# Patient Record
Sex: FEMALE | Race: WHITE | Employment: OTHER | URBAN - METROPOLITAN AREA
[De-identification: names, ages, dates, MRNs, and addresses within clinical notes are randomized per-mention and may not be internally consistent; named-entity substitution may affect disease eponyms.]

---

## 2021-03-02 LAB
ALBUMIN SERPL-MCNC: NORMAL G/DL
ALP BLD-CCNC: NORMAL U/L
ALT SERPL-CCNC: NORMAL U/L
ANION GAP SERPL CALCULATED.3IONS-SCNC: NORMAL MMOL/L
AST SERPL-CCNC: NORMAL U/L
BILIRUB SERPL-MCNC: NORMAL MG/DL
BUN BLDV-MCNC: NORMAL MG/DL
CALCIUM SERPL-MCNC: NORMAL MG/DL
CHLORIDE BLD-SCNC: NORMAL MMOL/L
CHOLESTEROL, TOTAL: NORMAL
CHOLESTEROL/HDL RATIO: NORMAL
CO2: NORMAL
CREAT SERPL-MCNC: NORMAL MG/DL
GFR CALCULATED: NORMAL
GLUCOSE BLD-MCNC: NORMAL MG/DL
HDLC SERPL-MCNC: NORMAL MG/DL
LDL CHOLESTEROL CALCULATED: NORMAL
NONHDLC SERPL-MCNC: NORMAL MG/DL
POTASSIUM SERPL-SCNC: NORMAL MMOL/L
SODIUM BLD-SCNC: NORMAL MMOL/L
TOTAL PROTEIN: NORMAL
TRIGL SERPL-MCNC: NORMAL MG/DL
VITAMIN D 25-HYDROXY: NORMAL
VITAMIN D2, 25 HYDROXY: NORMAL
VITAMIN D3,25 HYDROXY: NORMAL
VLDLC SERPL CALC-MCNC: NORMAL MG/DL

## 2022-03-21 LAB
ALBUMIN SERPL-MCNC: NORMAL G/DL
ALP BLD-CCNC: NORMAL U/L
ALT SERPL-CCNC: NORMAL U/L
ANION GAP SERPL CALCULATED.3IONS-SCNC: NORMAL MMOL/L
AST SERPL-CCNC: NORMAL U/L
BASOPHILS ABSOLUTE: NORMAL
BASOPHILS RELATIVE PERCENT: NORMAL
BILIRUB SERPL-MCNC: NORMAL MG/DL
BUN BLDV-MCNC: NORMAL MG/DL
CALCIUM SERPL-MCNC: NORMAL MG/DL
CHLORIDE BLD-SCNC: NORMAL MMOL/L
CHOLESTEROL, TOTAL: NORMAL
CHOLESTEROL/HDL RATIO: NORMAL
CO2: NORMAL
CREAT SERPL-MCNC: NORMAL MG/DL
EOSINOPHILS ABSOLUTE: NORMAL
EOSINOPHILS RELATIVE PERCENT: NORMAL
GFR CALCULATED: NORMAL
GLUCOSE BLD-MCNC: NORMAL MG/DL
HCT VFR BLD CALC: NORMAL %
HDLC SERPL-MCNC: NORMAL MG/DL
HEMOGLOBIN: NORMAL
LDL CHOLESTEROL CALCULATED: NORMAL
LYMPHOCYTES ABSOLUTE: NORMAL
LYMPHOCYTES RELATIVE PERCENT: NORMAL
MCH RBC QN AUTO: NORMAL PG
MCHC RBC AUTO-ENTMCNC: NORMAL G/DL
MCV RBC AUTO: NORMAL FL
MONOCYTES ABSOLUTE: NORMAL
MONOCYTES RELATIVE PERCENT: NORMAL
NEUTROPHILS ABSOLUTE: NORMAL
NEUTROPHILS RELATIVE PERCENT: NORMAL
NONHDLC SERPL-MCNC: NORMAL MG/DL
PLATELET # BLD: NORMAL 10*3/UL
PMV BLD AUTO: NORMAL FL
POTASSIUM SERPL-SCNC: NORMAL MMOL/L
RBC # BLD: NORMAL 10*6/UL
SODIUM BLD-SCNC: NORMAL MMOL/L
TOTAL PROTEIN: NORMAL
TRIGL SERPL-MCNC: NORMAL MG/DL
TSH SERPL DL<=0.05 MIU/L-ACNC: NORMAL M[IU]/L
VITAMIN D 25-HYDROXY: NORMAL
VITAMIN D2, 25 HYDROXY: NORMAL
VITAMIN D3,25 HYDROXY: NORMAL
VLDLC SERPL CALC-MCNC: NORMAL MG/DL
WBC # BLD: NORMAL 10*3/UL

## 2022-06-20 ENCOUNTER — OFFICE VISIT (OUTPATIENT)
Dept: PRIMARY CARE CLINIC | Age: 72
End: 2022-06-20
Payer: MEDICARE

## 2022-06-20 VITALS
HEIGHT: 61 IN | SYSTOLIC BLOOD PRESSURE: 149 MMHG | OXYGEN SATURATION: 93 % | BODY MASS INDEX: 19.07 KG/M2 | WEIGHT: 101 LBS | TEMPERATURE: 97.9 F | DIASTOLIC BLOOD PRESSURE: 86 MMHG | HEART RATE: 102 BPM

## 2022-06-20 DIAGNOSIS — H25.9 SENILE CATARACT OF RIGHT EYE, UNSPECIFIED AGE-RELATED CATARACT TYPE: Primary | ICD-10-CM

## 2022-06-20 PROCEDURE — 99213 OFFICE O/P EST LOW 20 MIN: CPT | Performed by: FAMILY MEDICINE

## 2022-06-20 PROCEDURE — 1123F ACP DISCUSS/DSCN MKR DOCD: CPT | Performed by: FAMILY MEDICINE

## 2022-06-20 RX ORDER — TIOTROPIUM BROMIDE 18 UG/1
CAPSULE ORAL; RESPIRATORY (INHALATION)
COMMUNITY
Start: 2022-05-20 | End: 2022-06-20 | Stop reason: SDUPTHER

## 2022-06-20 RX ORDER — ATORVASTATIN CALCIUM 10 MG/1
TABLET, FILM COATED ORAL
Qty: 30 TABLET | Refills: 3 | Status: SHIPPED
Start: 2022-06-20 | End: 2022-09-20 | Stop reason: SDUPTHER

## 2022-06-20 RX ORDER — BUDESONIDE AND FORMOTEROL FUMARATE DIHYDRATE 160; 4.5 UG/1; UG/1
AEROSOL RESPIRATORY (INHALATION)
COMMUNITY
Start: 2022-05-28 | End: 2022-06-20 | Stop reason: SDUPTHER

## 2022-06-20 RX ORDER — BUDESONIDE AND FORMOTEROL FUMARATE DIHYDRATE 160; 4.5 UG/1; UG/1
AEROSOL RESPIRATORY (INHALATION)
Qty: 10.2 G | Refills: 2 | Status: SHIPPED
Start: 2022-06-20 | End: 2022-09-19

## 2022-06-20 RX ORDER — MONTELUKAST SODIUM 10 MG/1
TABLET ORAL
Qty: 30 TABLET | Refills: 2 | Status: SHIPPED
Start: 2022-06-20 | End: 2022-09-20 | Stop reason: SDUPTHER

## 2022-06-20 RX ORDER — MONTELUKAST SODIUM 10 MG/1
TABLET ORAL
COMMUNITY
Start: 2022-05-20 | End: 2022-06-20 | Stop reason: SDUPTHER

## 2022-06-20 RX ORDER — ERGOCALCIFEROL 1.25 MG/1
50000 CAPSULE ORAL WEEKLY
Qty: 12 CAPSULE | Refills: 1 | Status: SHIPPED
Start: 2022-06-20 | End: 2022-09-20 | Stop reason: SDUPTHER

## 2022-06-20 RX ORDER — TIOTROPIUM BROMIDE 18 UG/1
CAPSULE ORAL; RESPIRATORY (INHALATION)
Qty: 30 CAPSULE | Refills: 2 | Status: SHIPPED
Start: 2022-06-20 | End: 2022-09-19

## 2022-06-20 SDOH — ECONOMIC STABILITY: TRANSPORTATION INSECURITY
IN THE PAST 12 MONTHS, HAS LACK OF TRANSPORTATION KEPT YOU FROM MEETINGS, WORK, OR FROM GETTING THINGS NEEDED FOR DAILY LIVING?: NO

## 2022-06-20 SDOH — ECONOMIC STABILITY: FOOD INSECURITY: WITHIN THE PAST 12 MONTHS, THE FOOD YOU BOUGHT JUST DIDN'T LAST AND YOU DIDN'T HAVE MONEY TO GET MORE.: NEVER TRUE

## 2022-06-20 SDOH — ECONOMIC STABILITY: INCOME INSECURITY: IN THE LAST 12 MONTHS, WAS THERE A TIME WHEN YOU WERE NOT ABLE TO PAY THE MORTGAGE OR RENT ON TIME?: NO

## 2022-06-20 SDOH — ECONOMIC STABILITY: FOOD INSECURITY: WITHIN THE PAST 12 MONTHS, YOU WORRIED THAT YOUR FOOD WOULD RUN OUT BEFORE YOU GOT MONEY TO BUY MORE.: NEVER TRUE

## 2022-06-20 SDOH — ECONOMIC STABILITY: HOUSING INSECURITY
IN THE LAST 12 MONTHS, WAS THERE A TIME WHEN YOU DID NOT HAVE A STEADY PLACE TO SLEEP OR SLEPT IN A SHELTER (INCLUDING NOW)?: NO

## 2022-06-20 SDOH — ECONOMIC STABILITY: TRANSPORTATION INSECURITY
IN THE PAST 12 MONTHS, HAS THE LACK OF TRANSPORTATION KEPT YOU FROM MEDICAL APPOINTMENTS OR FROM GETTING MEDICATIONS?: NO

## 2022-06-20 ASSESSMENT — PATIENT HEALTH QUESTIONNAIRE - PHQ9
SUM OF ALL RESPONSES TO PHQ QUESTIONS 1-9: 2
1. LITTLE INTEREST OR PLEASURE IN DOING THINGS: 1
SUM OF ALL RESPONSES TO PHQ QUESTIONS 1-9: 2
2. FEELING DOWN, DEPRESSED OR HOPELESS: 1
SUM OF ALL RESPONSES TO PHQ9 QUESTIONS 1 & 2: 2

## 2022-06-20 ASSESSMENT — SOCIAL DETERMINANTS OF HEALTH (SDOH): HOW HARD IS IT FOR YOU TO PAY FOR THE VERY BASICS LIKE FOOD, HOUSING, MEDICAL CARE, AND HEATING?: NOT VERY HARD

## 2022-06-20 NOTE — PROGRESS NOTES
Anitra Neumann (:  1950) is a 67 y.o. female,Established patient, here for evaluation of the following chief complaint(s):  Check-Up (had cataract surgery)  on left eye        Needs         ASSESSMENT/PLAN:  1. Senile cataract of right eye, unspecified age-related cataract type      Return in about 3 months (around 2022). Subjective   SUBJECTIVE/OBJECTIVE:  HPI      BP (!) 149/86   Pulse (!) 102   Temp 97.9 °F (36.6 °C)   Ht 5' 1\" (1.549 m)   Wt 101 lb (45.8 kg)   SpO2 93%   BMI 19.08 kg/m²     Review of Systems   Eyes: Positive for visual disturbance. Objective   Physical Exam  Constitutional:       General: She is not in acute distress. Appearance: Normal appearance. She is normal weight. Eyes:      Extraocular Movements: Extraocular movements intact. Pupils: Pupils are equal, round, and reactive to light. Cardiovascular:      Rate and Rhythm: Normal rate and regular rhythm. Heart sounds: Normal heart sounds. No murmur heard. Pulmonary:      Effort: Pulmonary effort is normal.      Breath sounds: Normal breath sounds. Neurological:      Mental Status: She is alert. Psychiatric:         Mood and Affect: Mood normal.                      An electronic signature was used to authenticate this note.     --Hoang Noble MD
Implemented All Universal Safety Interventions:  [place] to call system. Call bell, personal items and telephone within reach. Instruct patient to call for assistance. Room bathroom lighting operational. Non-slip footwear when patient is off stretcher. Physically safe environment: no spills, clutter or unnecessary equipment. Stretcher in lowest position, wheels locked, appropriate side rails in place.

## 2022-09-19 RX ORDER — BUDESONIDE AND FORMOTEROL FUMARATE DIHYDRATE 160; 4.5 UG/1; UG/1
AEROSOL RESPIRATORY (INHALATION)
Qty: 10.2 G | Refills: 2 | Status: SHIPPED
Start: 2022-09-19 | End: 2022-09-20 | Stop reason: SDUPTHER

## 2022-09-19 RX ORDER — TIOTROPIUM BROMIDE 18 UG/1
CAPSULE ORAL; RESPIRATORY (INHALATION)
Qty: 30 CAPSULE | Refills: 2 | Status: SHIPPED
Start: 2022-09-19 | End: 2022-09-20 | Stop reason: SDUPTHER

## 2022-09-20 ENCOUNTER — OFFICE VISIT (OUTPATIENT)
Dept: PRIMARY CARE CLINIC | Age: 72
End: 2022-09-20
Payer: MEDICARE

## 2022-09-20 VITALS
WEIGHT: 102 LBS | TEMPERATURE: 97.7 F | DIASTOLIC BLOOD PRESSURE: 80 MMHG | SYSTOLIC BLOOD PRESSURE: 157 MMHG | HEART RATE: 94 BPM | BODY MASS INDEX: 19.26 KG/M2 | HEIGHT: 61 IN | OXYGEN SATURATION: 98 %

## 2022-09-20 DIAGNOSIS — E55.9 VITAMIN D DEFICIENCY: ICD-10-CM

## 2022-09-20 DIAGNOSIS — H61.23 BILATERAL IMPACTED CERUMEN: ICD-10-CM

## 2022-09-20 DIAGNOSIS — E78.5 HYPERLIPIDEMIA, UNSPECIFIED HYPERLIPIDEMIA TYPE: ICD-10-CM

## 2022-09-20 DIAGNOSIS — J43.9 PULMONARY EMPHYSEMA, UNSPECIFIED EMPHYSEMA TYPE (HCC): Primary | ICD-10-CM

## 2022-09-20 PROCEDURE — 99214 OFFICE O/P EST MOD 30 MIN: CPT | Performed by: FAMILY MEDICINE

## 2022-09-20 PROCEDURE — 1123F ACP DISCUSS/DSCN MKR DOCD: CPT | Performed by: FAMILY MEDICINE

## 2022-09-20 RX ORDER — ERGOCALCIFEROL 1.25 MG/1
50000 CAPSULE ORAL WEEKLY
Qty: 12 CAPSULE | Refills: 1 | Status: SHIPPED | OUTPATIENT
Start: 2022-09-20

## 2022-09-20 RX ORDER — BUDESONIDE AND FORMOTEROL FUMARATE DIHYDRATE 160; 4.5 UG/1; UG/1
AEROSOL RESPIRATORY (INHALATION)
Qty: 10.2 G | Refills: 2 | Status: SHIPPED | OUTPATIENT
Start: 2022-09-20

## 2022-09-20 RX ORDER — ATORVASTATIN CALCIUM 10 MG/1
TABLET, FILM COATED ORAL
Qty: 30 TABLET | Refills: 3 | Status: SHIPPED | OUTPATIENT
Start: 2022-09-20

## 2022-09-20 RX ORDER — TIOTROPIUM BROMIDE 18 UG/1
18 CAPSULE ORAL; RESPIRATORY (INHALATION) DAILY
Qty: 30 CAPSULE | Refills: 2 | Status: SHIPPED | OUTPATIENT
Start: 2022-09-20

## 2022-09-20 RX ORDER — MONTELUKAST SODIUM 10 MG/1
TABLET ORAL
Qty: 30 TABLET | Refills: 2 | Status: SHIPPED | OUTPATIENT
Start: 2022-09-20

## 2022-09-20 ASSESSMENT — ENCOUNTER SYMPTOMS: SHORTNESS OF BREATH: 1

## 2022-09-20 NOTE — PROGRESS NOTES
Jose Ivey (:  1950) is a 67 y.o. female,Established patient, here for evaluation of the following chief complaint(s):  Sinusitis (Ears feel plugged and sinuses are bothering her)         ASSESSMENT/PLAN:  1. Pulmonary emphysema, unspecified emphysema type ([facility] [address]  -     SYMBICORT 160-4.5 MCG/ACT AERO; Inhale 2 puffs by mouth twice a day, Disp-10.2 g, R-2, DAWNormal  -     montelukast (SINGULAIR) 10 MG tablet; take 1 tablet by mouth once daily, Disp-30 tablet, R-2Normal  -     SPIRIVA HANDIHALER 18 MCG inhalation capsule; Inhale 1 capsule into the lungs daily, Disp-30 capsule, R-2, DAWNormal  2. Vitamin D deficiency  -     vitamin D (ERGOCALCIFEROL) 1.25 MG (81399 UT) CAPS capsule; Take 1 capsule by mouth once a week, Disp-12 capsule, R-1Normal  3. Hyperlipidemia, unspecified hyperlipidemia type  -     atorvastatin (LIPITOR) 10 MG tablet; take 1 tablet by mouth once daily, Disp-30 tablet, R-3Normal  4. Bilateral impacted cerumen  -     carbamide peroxide (DEBROX) 6.5 % otic solution; Place 5 drops in ear(s) 2 times daily, Disp-15 mL, R-1Normal      No follow-ups on file. Subjective   SUBJECTIVE/OBJECTIVE:  Sinusitis  Associated symptoms include shortness of breath.    COPD:   Primary symptoms:  [] chest tightness  [] hoarse voice  [x] Cough  [] shortness of breath [] difficulty breathing  [] sputum production  [] frequent throat clearing [] Wheezing   [] Hemoptysis    Chronicity: [] New  [] Recurrent  [x] Chronic    Onset  [] Today   [] Yesterday   [] in the past 7 days    [] 1 to 4 weeks ago  [] more than 1 month ago [x] more than 1 year ago    Frequency  [] Constantly   [] 2 to 4 times per day  [x] Daily   [] every several days [] Intermittently   [] Rarely     Progression since onset  [] Unchanged  [] Resolved   [] gradually improving   [] rapidly improving  [x] gradually worsening [] rapidly worsening  [] waxing and waning    Cough characteristics  [x] non-productive  [] Productive  [] Hoarse  [] Barking   [] Productive of sputum [] Nocturnal  [] Croupy  []Paroxysmal  [] Productive of purulent sputum   [] Dry    [] Supine   [] Productive of brown sputum   [] Hacking  [] Harsh    [] Productive of blood-tinged sputum  [] vomit inducing  [] Productive of bloody sputum    Associated symptoms  [] Appetite change  [] Heartburn  [] Rhinorrhea  [] chest pain  [] Malaise/fatigue  [] Sneezing  [] Dyspnea on exertion  [] Myalgias   [] Sore throat  [] Ear congestion   [] nasal congestion  [] Sweats  [] ear pain  [] Orthopnea  [] trouble swallowing  [] Fever  [] PND  [] weight loss  [] Headaches   [] post-nasal drip    Aggravated by  [] Nothing  [] emotional stress [] minimal activity [] animal exposure  [] Exercise  [] occupational exposure   [] any activity  [] exposure to fumes [] Pollen  [] Change in weather   [] Exposure to smoke   [] Strenuous activity [] Climbing stairs  [] Lying down  [] URI   [] eating    Alleviated by  [] Nothing  [] Diuretics  [] OTC cough suppressant [] anxiolytics  [] Ipratropium  [] OTC inhaler [] Beta-agonist    [] Leukotriene antagonist   [] Prescription cough suppressant  [] Change in position    [] Nitroglycerine  [] rest  [] Cold air  [] Oral steroids [] Steroid inhaler      Improvement on treatment  [] No  [] Minimal  [] Moderate [] Significant  [] Complete     Not alleviated by  [] Anxiolytic  [] Ipratropium  [] OTC inhaler [] Beta-agonist   [] Leukotriene antagonist   [] Prescription cough suppressant  [] Change in position    [] Rest  [] Cold air  [] Oral steroids [] Steroid inhaler [] Diuretics  [] Nitroglycerin   [] OTC cough suppressant    Lung disease risks  [] No known risk factors  [] Occupational exposure  [] Travel   [] Animal exposure   [] Smoking/tobacco exposure    PMH includes  [] Asthma  [] Bronchitis  [] Emphysema [] Bronchiectasis  [] COPD  [] Pneumonia      BP (!) 157/80   Pulse 94   Temp 97.7 °F (36.5 °C) (Temporal)   Ht 5' 1\" (1.549 m)   Wt 102 lb (46.3 kg)   LMP  (LMP Unknown)   SpO2 98%   BMI 19.27 kg/m²     Review of Systems   Respiratory:  Positive for shortness of breath. No results found for: NA, K, CL, CO2, BUN, CREATININE, GLUCOSE, CALCIUM, PROT, LABALBU, BILITOT, ALKPHOS, AST, ALT, LABGLOM, GFRAA, AGRATIO, GLOB  No results found for: CHOL, TRIG, HDL, LDLCHOLESTEROL, LDLCALC, LABVLDL, VLDL, CHOLHDLRATIO  No results found for: WBC, HGB, HCT, MCV, PLT  No results found for: TSHFT4, TSH, TSHREFLEX, EXL9DPB  No results found for: VITD25      Objective   Physical Exam  Constitutional:       Appearance: Normal appearance. Cardiovascular:      Rate and Rhythm: Normal rate and regular rhythm. Heart sounds: Normal heart sounds. No murmur heard. Pulmonary:      Comments: Decreased  breath sounds   Abdominal:      Tenderness: There is no abdominal tenderness. Neurological:      Mental Status: She is alert. Psychiatric:         Mood and Affect: Mood normal.                    An electronic signature was used to authenticate this note.     --Herve Llanso MD

## 2022-11-23 ENCOUNTER — TELEPHONE (OUTPATIENT)
Dept: PRIMARY CARE CLINIC | Age: 72
End: 2022-11-23

## 2022-12-16 DIAGNOSIS — E78.5 HYPERLIPIDEMIA, UNSPECIFIED HYPERLIPIDEMIA TYPE: ICD-10-CM

## 2022-12-16 DIAGNOSIS — J43.9 PULMONARY EMPHYSEMA, UNSPECIFIED EMPHYSEMA TYPE (HCC): ICD-10-CM

## 2022-12-16 RX ORDER — ATORVASTATIN CALCIUM 10 MG/1
TABLET, FILM COATED ORAL
Qty: 30 TABLET | Refills: 3 | Status: SHIPPED | OUTPATIENT
Start: 2022-12-16

## 2022-12-16 RX ORDER — MONTELUKAST SODIUM 10 MG/1
TABLET ORAL
Qty: 30 TABLET | Refills: 2 | Status: SHIPPED | OUTPATIENT
Start: 2022-12-16

## 2022-12-16 RX ORDER — TIOTROPIUM BROMIDE 18 UG/1
18 CAPSULE ORAL; RESPIRATORY (INHALATION) DAILY
Qty: 30 CAPSULE | Refills: 2 | Status: SHIPPED | OUTPATIENT
Start: 2022-12-16

## 2022-12-16 RX ORDER — BUDESONIDE AND FORMOTEROL FUMARATE DIHYDRATE 160; 4.5 UG/1; UG/1
AEROSOL RESPIRATORY (INHALATION)
Qty: 10.2 G | Refills: 2 | Status: SHIPPED | OUTPATIENT
Start: 2022-12-16

## 2023-01-23 ENCOUNTER — COMMUNITY OUTREACH (OUTPATIENT)
Dept: PRIMARY CARE CLINIC | Age: 73
End: 2023-01-23

## 2023-01-23 NOTE — PROGRESS NOTES
Patient's HM shows they are overdue for colonoscopy, mammogram.   [employer] and  files searched  without success.

## 2023-03-09 DIAGNOSIS — E55.9 VITAMIN D DEFICIENCY: ICD-10-CM

## 2023-03-09 DIAGNOSIS — J43.9 PULMONARY EMPHYSEMA, UNSPECIFIED EMPHYSEMA TYPE (HCC): ICD-10-CM

## 2023-03-09 DIAGNOSIS — E78.5 HYPERLIPIDEMIA, UNSPECIFIED HYPERLIPIDEMIA TYPE: ICD-10-CM

## 2023-03-09 RX ORDER — ERGOCALCIFEROL 1.25 MG/1
50000 CAPSULE ORAL WEEKLY
Qty: 12 CAPSULE | Refills: 1 | Status: SHIPPED | OUTPATIENT
Start: 2023-03-09

## 2023-03-09 RX ORDER — ATORVASTATIN CALCIUM 10 MG/1
TABLET, FILM COATED ORAL
Qty: 30 TABLET | Refills: 3 | Status: SHIPPED | OUTPATIENT
Start: 2023-03-09

## 2023-03-09 RX ORDER — MONTELUKAST SODIUM 10 MG/1
TABLET ORAL
Qty: 30 TABLET | Refills: 2 | Status: SHIPPED | OUTPATIENT
Start: 2023-03-09

## 2023-03-09 RX ORDER — TIOTROPIUM BROMIDE 18 UG/1
18 CAPSULE ORAL; RESPIRATORY (INHALATION) DAILY
Qty: 30 CAPSULE | Refills: 2 | Status: SHIPPED | OUTPATIENT
Start: 2023-03-09

## 2023-03-09 RX ORDER — BUDESONIDE AND FORMOTEROL FUMARATE DIHYDRATE 160; 4.5 UG/1; UG/1
AEROSOL RESPIRATORY (INHALATION)
Qty: 10.2 G | Refills: 2 | Status: SHIPPED | OUTPATIENT
Start: 2023-03-09

## 2023-03-09 NOTE — TELEPHONE ENCOUNTER
Patient called and would like a 90 days supply of refills while she finds a new pcp in her area.      atorvastatin (LIPITOR) 10 MG tablet    montelukast (SINGULAIR) 10 MG tablet    SPIRIVA HANDIHALER 18 MCG inhalation capsule    SYMBICORT 160-4.5 MCG/ACT AERO    vitamin D (ERGOCALCIFEROL) 1.25 MG (27922 UT) CAPS capsule    [id number] [place], [place] - [address] [place] [address]  [phone]

## 2023-06-06 ENCOUNTER — OFFICE VISIT (OUTPATIENT)
Dept: PRIMARY CARE CLINIC | Age: 73
End: 2023-06-06
Payer: MEDICARE

## 2023-06-06 VITALS
DIASTOLIC BLOOD PRESSURE: 78 MMHG | SYSTOLIC BLOOD PRESSURE: 132 MMHG | WEIGHT: 96.6 LBS | OXYGEN SATURATION: 98 % | HEART RATE: 99 BPM | TEMPERATURE: 97.2 F | BODY MASS INDEX: 18.24 KG/M2 | HEIGHT: 61 IN

## 2023-06-06 DIAGNOSIS — E55.9 VITAMIN D DEFICIENCY: ICD-10-CM

## 2023-06-06 DIAGNOSIS — E78.5 HYPERLIPIDEMIA, UNSPECIFIED HYPERLIPIDEMIA TYPE: ICD-10-CM

## 2023-06-06 DIAGNOSIS — J44.9 CHRONIC OBSTRUCTIVE PULMONARY DISEASE, UNSPECIFIED COPD TYPE (HCC): Primary | ICD-10-CM

## 2023-06-06 DIAGNOSIS — J43.9 PULMONARY EMPHYSEMA, UNSPECIFIED EMPHYSEMA TYPE (HCC): ICD-10-CM

## 2023-06-06 PROCEDURE — 99214 OFFICE O/P EST MOD 30 MIN: CPT | Performed by: NURSE PRACTITIONER

## 2023-06-06 PROCEDURE — 1123F ACP DISCUSS/DSCN MKR DOCD: CPT | Performed by: NURSE PRACTITIONER

## 2023-06-06 RX ORDER — ERGOCALCIFEROL 1.25 MG/1
50000 CAPSULE ORAL WEEKLY
Qty: 12 CAPSULE | Refills: 1 | Status: SHIPPED | OUTPATIENT
Start: 2023-06-06

## 2023-06-06 RX ORDER — BUDESONIDE AND FORMOTEROL FUMARATE DIHYDRATE 160; 4.5 UG/1; UG/1
AEROSOL RESPIRATORY (INHALATION)
Qty: 10.2 G | Refills: 2 | Status: SHIPPED | OUTPATIENT
Start: 2023-06-06

## 2023-06-06 RX ORDER — ATORVASTATIN CALCIUM 10 MG/1
TABLET, FILM COATED ORAL
Qty: 30 TABLET | Refills: 3 | Status: SHIPPED | OUTPATIENT
Start: 2023-06-06

## 2023-06-06 RX ORDER — MONTELUKAST SODIUM 10 MG/1
TABLET ORAL
Qty: 30 TABLET | Refills: 2 | Status: SHIPPED | OUTPATIENT
Start: 2023-06-06

## 2023-06-06 RX ORDER — TIOTROPIUM BROMIDE 18 UG/1
18 CAPSULE ORAL; RESPIRATORY (INHALATION) DAILY
Qty: 30 CAPSULE | Refills: 2 | Status: SHIPPED | OUTPATIENT
Start: 2023-06-06

## 2023-06-06 ASSESSMENT — ENCOUNTER SYMPTOMS
ABDOMINAL PAIN: 0
TROUBLE SWALLOWING: 0
NAUSEA: 0
EYE ITCHING: 0
SHORTNESS OF BREATH: 1
VOMITING: 0
CONSTIPATION: 0
SINUS PAIN: 0
ABDOMINAL DISTENTION: 0
PHOTOPHOBIA: 0
CHOKING: 0
RHINORRHEA: 0
COLOR CHANGE: 0
EYE PAIN: 0
SORE THROAT: 0
COUGH: 0
EYE DISCHARGE: 0
BLOOD IN STOOL: 0
SINUS PRESSURE: 0
VOICE CHANGE: 0
BACK PAIN: 0
CHEST TIGHTNESS: 0
WHEEZING: 0
DIARRHEA: 0
EYE REDNESS: 0
FACIAL SWELLING: 0

## 2023-06-06 ASSESSMENT — PATIENT HEALTH QUESTIONNAIRE - PHQ9
SUM OF ALL RESPONSES TO PHQ QUESTIONS 1-9: 0
SUM OF ALL RESPONSES TO PHQ QUESTIONS 1-9: 0
SUM OF ALL RESPONSES TO PHQ9 QUESTIONS 1 & 2: 0
SUM OF ALL RESPONSES TO PHQ QUESTIONS 1-9: 0
2. FEELING DOWN, DEPRESSED OR HOPELESS: 0
SUM OF ALL RESPONSES TO PHQ QUESTIONS 1-9: 0
1. LITTLE INTEREST OR PLEASURE IN DOING THINGS: 0

## 2023-06-06 ASSESSMENT — VISUAL ACUITY: OU: 1

## 2023-06-06 NOTE — PROGRESS NOTES
by mouth twice a day  -     CBC with Auto Differential; Future  -     Comprehensive Metabolic Panel; Future    Pulmonary emphysema, unspecified emphysema type ([facility] [address]  Comments:  Chronic, controlled on medication  Orders:  -     Elis Oseas 18 MCG inhalation capsule; Inhale 1 capsule into the lungs daily  -     SYMBICORT 160-4.5 MCG/ACT AERO; Inhale 2 puffs by mouth twice a day  -     montelukast (SINGULAIR) 10 MG tablet; take 1 tablet by mouth once daily    Hyperlipidemia, unspecified hyperlipidemia type  Comments:  Chronic, will assess with blood work  Orders:  -     atorvastatin (LIPITOR) 10 MG tablet; take 1 tablet by mouth once daily  -     Lipid Panel; Future  -     TSH; Future    Vitamin D deficiency  Comments:  Chronic, will assess with blood work  Orders:  -     vitamin D (ERGOCALCIFEROL) 1.25 MG (35417 UT) CAPS capsule; Take 1 capsule by mouth once a week  -     Vitamin D 25 Hydroxy; Future    *Fam Archuleta is seen today to establish as a new patient with this practice. She reports that she was previously with a [facility]) provider, in the [facility] area. She reports that that office had closed, so she needed to reestablish at a [facility]) office. She reports that she is doing fairly well today and that she is having no acute complaints. She does report that she has a history of COPD and states that she has some shortness of breath with exertion. She denies any shortness of breath, while at rest.  She states that she has been dealing with some allergies, but states that the Singulair that she is on has done well for her. She does report some nasal congestion, but states it is much better than what it had been in the past.  Lastly, she does report that she has been very anxious for this appointment. She states that she has not been sleeping well, due to the anxiety.   She denies any further issues at this time  I did review her vital signs, allergies, medications, medical history, social

## 2023-07-03 DIAGNOSIS — E78.5 HYPERLIPIDEMIA, UNSPECIFIED HYPERLIPIDEMIA TYPE: ICD-10-CM

## 2023-07-03 DIAGNOSIS — E55.9 VITAMIN D DEFICIENCY: ICD-10-CM

## 2023-07-03 DIAGNOSIS — J44.9 CHRONIC OBSTRUCTIVE PULMONARY DISEASE, UNSPECIFIED COPD TYPE (HCC): ICD-10-CM

## 2023-07-03 LAB
ALBUMIN SERPL-MCNC: 4.2 G/DL (ref 3.5–5.2)
ALP SERPL-CCNC: 69 U/L (ref 35–104)
ALT SERPL-CCNC: 20 U/L (ref 0–32)
ANION GAP SERPL CALCULATED.3IONS-SCNC: 15 MMOL/L (ref 7–16)
AST SERPL-CCNC: 23 U/L (ref 0–31)
BASOPHILS # BLD: 0.03 E9/L (ref 0–0.2)
BASOPHILS NFR BLD: 0.5 % (ref 0–2)
BILIRUB SERPL-MCNC: 0.4 MG/DL (ref 0–1.2)
BUN SERPL-MCNC: 10 MG/DL (ref 6–23)
CALCIUM SERPL-MCNC: 9.7 MG/DL (ref 8.6–10.2)
CHLORIDE SERPL-SCNC: 107 MMOL/L (ref 98–107)
CHOLESTEROL, TOTAL: 181 MG/DL (ref 0–199)
CO2 SERPL-SCNC: 23 MMOL/L (ref 22–29)
CREAT SERPL-MCNC: 0.8 MG/DL (ref 0.5–1)
EOSINOPHIL # BLD: 0.08 E9/L (ref 0.05–0.5)
EOSINOPHIL NFR BLD: 1.3 % (ref 0–6)
ERYTHROCYTE [DISTWIDTH] IN BLOOD BY AUTOMATED COUNT: 13.6 FL (ref 11.5–15)
GLUCOSE SERPL-MCNC: 97 MG/DL (ref 74–99)
HCT VFR BLD AUTO: 47.3 % (ref 34–48)
HDLC SERPL-MCNC: 74 MG/DL
HGB BLD-MCNC: 15.1 G/DL (ref 11.5–15.5)
IMM GRANULOCYTES # BLD: 0.01 E9/L
IMM GRANULOCYTES NFR BLD: 0.2 % (ref 0–5)
LDLC SERPL CALC-MCNC: 95 MG/DL (ref 0–99)
LYMPHOCYTES # BLD: 1.1 E9/L (ref 1.5–4)
LYMPHOCYTES NFR BLD: 17.4 % (ref 20–42)
MCH RBC QN AUTO: 31.5 PG (ref 26–35)
MCHC RBC AUTO-ENTMCNC: 31.9 % (ref 32–34.5)
MCV RBC AUTO: 98.7 FL (ref 80–99.9)
MONOCYTES # BLD: 0.42 E9/L (ref 0.1–0.95)
MONOCYTES NFR BLD: 6.7 % (ref 2–12)
NEUTROPHILS # BLD: 4.67 E9/L (ref 1.8–7.3)
NEUTS SEG NFR BLD: 73.9 % (ref 43–80)
PLATELET # BLD AUTO: 275 E9/L (ref 130–450)
PMV BLD AUTO: 10.2 FL (ref 7–12)
POTASSIUM SERPL-SCNC: 4.5 MMOL/L (ref 3.5–5)
PROT SERPL-MCNC: 7.2 G/DL (ref 6.4–8.3)
RBC # BLD AUTO: 4.79 E12/L (ref 3.5–5.5)
SODIUM SERPL-SCNC: 145 MMOL/L (ref 132–146)
TRIGL SERPL-MCNC: 61 MG/DL (ref 0–149)
TSH SERPL-MCNC: 1.95 UIU/ML (ref 0.27–4.2)
VITAMIN D 25-HYDROXY: 90 NG/ML (ref 30–100)
VLDLC SERPL CALC-MCNC: 12 MG/DL
WBC # BLD: 6.3 E9/L (ref 4.5–11.5)

## 2023-07-07 ENCOUNTER — OFFICE VISIT (OUTPATIENT)
Dept: PRIMARY CARE CLINIC | Age: 73
End: 2023-07-07

## 2023-07-07 VITALS
DIASTOLIC BLOOD PRESSURE: 72 MMHG | OXYGEN SATURATION: 95 % | HEART RATE: 96 BPM | WEIGHT: 97.4 LBS | TEMPERATURE: 98.3 F | BODY MASS INDEX: 18.4 KG/M2 | SYSTOLIC BLOOD PRESSURE: 134 MMHG

## 2023-07-07 DIAGNOSIS — E55.9 VITAMIN D DEFICIENCY: ICD-10-CM

## 2023-07-07 DIAGNOSIS — J44.9 CHRONIC OBSTRUCTIVE PULMONARY DISEASE, UNSPECIFIED COPD TYPE (HCC): Primary | ICD-10-CM

## 2023-07-07 DIAGNOSIS — E78.5 HYPERLIPIDEMIA, UNSPECIFIED HYPERLIPIDEMIA TYPE: ICD-10-CM

## 2023-07-07 DIAGNOSIS — J43.9 PULMONARY EMPHYSEMA, UNSPECIFIED EMPHYSEMA TYPE (HCC): ICD-10-CM

## 2023-07-07 SDOH — ECONOMIC STABILITY: FOOD INSECURITY: WITHIN THE PAST 12 MONTHS, THE FOOD YOU BOUGHT JUST DIDN'T LAST AND YOU DIDN'T HAVE MONEY TO GET MORE.: NEVER TRUE

## 2023-07-07 SDOH — ECONOMIC STABILITY: FOOD INSECURITY: WITHIN THE PAST 12 MONTHS, YOU WORRIED THAT YOUR FOOD WOULD RUN OUT BEFORE YOU GOT MONEY TO BUY MORE.: NEVER TRUE

## 2023-07-07 SDOH — ECONOMIC STABILITY: INCOME INSECURITY: HOW HARD IS IT FOR YOU TO PAY FOR THE VERY BASICS LIKE FOOD, HOUSING, MEDICAL CARE, AND HEATING?: NOT HARD AT ALL

## 2023-07-07 ASSESSMENT — ENCOUNTER SYMPTOMS
COUGH: 0
NAUSEA: 0
ABDOMINAL DISTENTION: 0
BLOOD IN STOOL: 0
EYE DISCHARGE: 0
COLOR CHANGE: 0
WHEEZING: 0
SINUS PAIN: 0
PHOTOPHOBIA: 0
BACK PAIN: 0
FACIAL SWELLING: 0
VOICE CHANGE: 0
CHEST TIGHTNESS: 0
EYE REDNESS: 0
SINUS PRESSURE: 0
EYE PAIN: 0
SHORTNESS OF BREATH: 1
ABDOMINAL PAIN: 0
CONSTIPATION: 0
TROUBLE SWALLOWING: 0
VOMITING: 0
SORE THROAT: 0
DIARRHEA: 0
EYE ITCHING: 0
RHINORRHEA: 0
CHOKING: 0

## 2023-07-07 NOTE — PROGRESS NOTES
diaphoretic. HENT:      Head: Normocephalic and atraumatic. Right Ear: Hearing, tympanic membrane, ear canal and external ear normal. There is no impacted cerumen. Left Ear: Hearing, tympanic membrane, ear canal and external ear normal. There is no impacted cerumen. Nose: Mild congestion present. No rhinorrhea. Mouth/Throat:      Lips: Pink. No lesions. Mouth: Mucous membranes are moist.      Pharynx: Oropharynx is clear. No oropharyngeal exudate or posterior oropharyngeal erythema. Eyes:      General: Lids are normal. Vision grossly intact. Gaze aligned appropriately. No scleral icterus. Right eye: No discharge. Left eye: No discharge. Extraocular Movements: Extraocular movements intact. Conjunctiva/sclera: Conjunctivae normal.      Right eye: Right conjunctiva is not injected. Left eye: Left conjunctiva is not injected. Pupils: Pupils are equal, round, and reactive to light. Neck:      Thyroid: No thyromegaly. Vascular: No carotid bruit. Trachea: Trachea normal.   Cardiovascular:      Rate and Rhythm: Normal rate and regular rhythm. Pulses: Normal pulses. Heart sounds: Normal heart sounds, S1 normal and S2 normal. No murmur heard. No friction rub. No gallop. Pulmonary:      Effort: Pulmonary effort is normal. No tachypnea, accessory muscle usage or respiratory distress. Breath sounds: Normal breath sounds and air entry. No stridor. No wheezing, rhonchi or rales. Chest:      Chest wall: No tenderness. Abdominal:      General: Bowel sounds are normal. There is no distension. Palpations: Abdomen is soft. There is no mass. Tenderness: There is no abdominal tenderness. There is no right CVA tenderness, left CVA tenderness, guarding or rebound. Hernia: No hernia is present. Musculoskeletal:         General: No swelling, tenderness, deformity or signs of injury. Normal range of motion.       Cervical back:

## 2023-09-12 DIAGNOSIS — J44.9 CHRONIC OBSTRUCTIVE PULMONARY DISEASE, UNSPECIFIED COPD TYPE (HCC): ICD-10-CM

## 2023-09-12 DIAGNOSIS — E78.5 HYPERLIPIDEMIA, UNSPECIFIED HYPERLIPIDEMIA TYPE: ICD-10-CM

## 2023-09-12 DIAGNOSIS — J43.9 PULMONARY EMPHYSEMA, UNSPECIFIED EMPHYSEMA TYPE (HCC): ICD-10-CM

## 2023-09-12 DIAGNOSIS — E55.9 VITAMIN D DEFICIENCY: ICD-10-CM

## 2023-09-12 RX ORDER — ERGOCALCIFEROL 1.25 MG/1
50000 CAPSULE ORAL WEEKLY
Qty: 12 CAPSULE | Refills: 1 | Status: SHIPPED | OUTPATIENT
Start: 2023-09-12

## 2023-09-12 RX ORDER — TIOTROPIUM BROMIDE 18 UG/1
18 CAPSULE ORAL; RESPIRATORY (INHALATION) DAILY
Qty: 30 CAPSULE | Refills: 5 | Status: SHIPPED | OUTPATIENT
Start: 2023-09-12

## 2023-09-12 RX ORDER — ATORVASTATIN CALCIUM 10 MG/1
TABLET, FILM COATED ORAL
Qty: 30 TABLET | Refills: 5 | Status: SHIPPED | OUTPATIENT
Start: 2023-09-12

## 2023-09-12 RX ORDER — BUDESONIDE AND FORMOTEROL FUMARATE DIHYDRATE 160; 4.5 UG/1; UG/1
AEROSOL RESPIRATORY (INHALATION)
Qty: 10.2 G | Refills: 5 | Status: SHIPPED | OUTPATIENT
Start: 2023-09-12

## 2023-09-12 RX ORDER — MONTELUKAST SODIUM 10 MG/1
TABLET ORAL
Qty: 30 TABLET | Refills: 5 | Status: SHIPPED | OUTPATIENT
Start: 2023-09-12

## 2023-10-10 ENCOUNTER — OFFICE VISIT (OUTPATIENT)
Dept: PRIMARY CARE CLINIC | Age: 73
End: 2023-10-10
Payer: MEDICARE

## 2023-10-10 VITALS
HEIGHT: 61 IN | DIASTOLIC BLOOD PRESSURE: 74 MMHG | BODY MASS INDEX: 18.39 KG/M2 | OXYGEN SATURATION: 94 % | SYSTOLIC BLOOD PRESSURE: 134 MMHG | WEIGHT: 97.4 LBS | HEART RATE: 105 BPM | RESPIRATION RATE: 18 BRPM | TEMPERATURE: 97.5 F

## 2023-10-10 VITALS
BODY MASS INDEX: 18.39 KG/M2 | HEIGHT: 61 IN | RESPIRATION RATE: 18 BRPM | TEMPERATURE: 97.5 F | WEIGHT: 97.4 LBS | HEART RATE: 105 BPM | SYSTOLIC BLOOD PRESSURE: 134 MMHG | OXYGEN SATURATION: 94 % | DIASTOLIC BLOOD PRESSURE: 74 MMHG

## 2023-10-10 DIAGNOSIS — E78.5 HYPERLIPIDEMIA, UNSPECIFIED HYPERLIPIDEMIA TYPE: ICD-10-CM

## 2023-10-10 DIAGNOSIS — E55.9 VITAMIN D DEFICIENCY: ICD-10-CM

## 2023-10-10 DIAGNOSIS — Z00.00 MEDICARE ANNUAL WELLNESS VISIT, SUBSEQUENT: Primary | ICD-10-CM

## 2023-10-10 DIAGNOSIS — Z87.891 PERSONAL HISTORY OF TOBACCO USE: ICD-10-CM

## 2023-10-10 DIAGNOSIS — J43.9 PULMONARY EMPHYSEMA, UNSPECIFIED EMPHYSEMA TYPE (HCC): ICD-10-CM

## 2023-10-10 DIAGNOSIS — Z12.31 ENCOUNTER FOR SCREENING MAMMOGRAM FOR MALIGNANT NEOPLASM OF BREAST: ICD-10-CM

## 2023-10-10 DIAGNOSIS — J44.9 CHRONIC OBSTRUCTIVE PULMONARY DISEASE, UNSPECIFIED COPD TYPE (HCC): ICD-10-CM

## 2023-10-10 DIAGNOSIS — J44.9 CHRONIC OBSTRUCTIVE PULMONARY DISEASE, UNSPECIFIED COPD TYPE (HCC): Primary | ICD-10-CM

## 2023-10-10 PROCEDURE — 1123F ACP DISCUSS/DSCN MKR DOCD: CPT | Performed by: NURSE PRACTITIONER

## 2023-10-10 PROCEDURE — G0296 VISIT TO DETERM LDCT ELIG: HCPCS | Performed by: NURSE PRACTITIONER

## 2023-10-10 PROCEDURE — 99213 OFFICE O/P EST LOW 20 MIN: CPT | Performed by: NURSE PRACTITIONER

## 2023-10-10 PROCEDURE — G0439 PPPS, SUBSEQ VISIT: HCPCS | Performed by: NURSE PRACTITIONER

## 2023-10-10 ASSESSMENT — ENCOUNTER SYMPTOMS
SHORTNESS OF BREATH: 1
COUGH: 0
CHOKING: 0
ABDOMINAL DISTENTION: 0
DIARRHEA: 0
FACIAL SWELLING: 0
EYE ITCHING: 0
VOICE CHANGE: 0
SINUS PRESSURE: 0
WHEEZING: 0
EYE REDNESS: 0
CHEST TIGHTNESS: 0
PHOTOPHOBIA: 0
EYE PAIN: 0
SORE THROAT: 0
CONSTIPATION: 0
SINUS PAIN: 0
VOMITING: 0
TROUBLE SWALLOWING: 0
EYE DISCHARGE: 0
BACK PAIN: 0
RHINORRHEA: 0
COLOR CHANGE: 0
BLOOD IN STOOL: 0
NAUSEA: 0
ABDOMINAL PAIN: 0

## 2023-10-10 ASSESSMENT — PATIENT HEALTH QUESTIONNAIRE - PHQ9
SUM OF ALL RESPONSES TO PHQ9 QUESTIONS 1 & 2: 0
1. LITTLE INTEREST OR PLEASURE IN DOING THINGS: 0
SUM OF ALL RESPONSES TO PHQ QUESTIONS 1-9: 0
2. FEELING DOWN, DEPRESSED OR HOPELESS: 0

## 2023-10-10 ASSESSMENT — LIFESTYLE VARIABLES
HOW MANY STANDARD DRINKS CONTAINING ALCOHOL DO YOU HAVE ON A TYPICAL DAY: PATIENT DOES NOT DRINK
HOW OFTEN DO YOU HAVE A DRINK CONTAINING ALCOHOL: NEVER

## 2023-10-10 NOTE — PROGRESS NOTES
Skin is warm and dry. Capillary Refill: Capillary refill takes less than 2 seconds. Coloration: Skin is not jaundiced or pale. Findings: No bruising, erythema, lesion or rash. Neurological:      General: No focal deficit present. Mental Status: She is alert and oriented to person, place, and time. Mental status is at baseline. Cranial Nerves: Cranial nerves 2-12 are intact. No cranial nerve deficit. Sensory: Sensation is intact. No sensory deficit. Motor: Motor function is intact. No weakness. Coordination: Coordination is intact. Coordination normal.      Gait: Gait is intact. Gait normal.      Deep Tendon Reflexes: Reflexes normal.   Psychiatric:         Attention and Perception: Attention and perception normal.         Mood and Affect: Mood and affect normal.         Speech: Speech normal.         Behavior: Behavior normal. Behavior is cooperative. Thought Content: Thought content normal.         Cognition and Memory: Cognition and memory normal.         Judgment: Judgment normal.     Assessment and Plan:  Sandrita Domínguez was seen today for copd. Diagnoses and all orders for this visit:    Chronic obstructive pulmonary disease, unspecified COPD type ([address])  Comments:  Chronic, stable at this time    Pulmonary emphysema, unspecified emphysema type ([address])  Comments:  Chronic, stable at this time    Hyperlipidemia, unspecified hyperlipidemia type  Comments:  Chronic, stable    Vitamin D deficiency  Comments:  Chronic, stable    Encounter for screening mammogram for malignant neoplasm of breast    *Sandrita Domínguez is seen today for a 3-month follow-up and a review of her overall condition and plan of care. She reports that she is doing fairly well today and that she is having no acute complaints. She does continue to report that she has shortness of breath, secondary to COPD. She states it is no worse than normal and she has been able to perform all of her ADLs.   She denies any English

## 2023-10-10 NOTE — PROGRESS NOTES
Educated patient due for Mammo, Colonoscopy, Dexa scan, patient noted she is done with doing all that stuff

## 2023-12-14 DIAGNOSIS — J43.9 PULMONARY EMPHYSEMA, UNSPECIFIED EMPHYSEMA TYPE (HCC): ICD-10-CM

## 2023-12-14 DIAGNOSIS — J44.9 CHRONIC OBSTRUCTIVE PULMONARY DISEASE, UNSPECIFIED COPD TYPE (HCC): ICD-10-CM

## 2023-12-14 RX ORDER — BUDESONIDE AND FORMOTEROL FUMARATE DIHYDRATE 160; 4.5 UG/1; UG/1
AEROSOL RESPIRATORY (INHALATION)
Qty: 10.2 G | Refills: 5 | Status: SHIPPED | OUTPATIENT
Start: 2023-12-14

## 2024-01-10 ENCOUNTER — OFFICE VISIT (OUTPATIENT)
Dept: PRIMARY CARE CLINIC | Age: 74
End: 2024-01-10
Payer: MEDICARE

## 2024-01-10 VITALS
WEIGHT: 97.8 LBS | BODY MASS INDEX: 18.48 KG/M2 | OXYGEN SATURATION: 95 % | HEART RATE: 106 BPM | SYSTOLIC BLOOD PRESSURE: 134 MMHG | DIASTOLIC BLOOD PRESSURE: 74 MMHG | TEMPERATURE: 97.2 F

## 2024-01-10 DIAGNOSIS — E78.5 HYPERLIPIDEMIA, UNSPECIFIED HYPERLIPIDEMIA TYPE: ICD-10-CM

## 2024-01-10 DIAGNOSIS — E55.9 VITAMIN D DEFICIENCY: ICD-10-CM

## 2024-01-10 DIAGNOSIS — J43.9 PULMONARY EMPHYSEMA, UNSPECIFIED EMPHYSEMA TYPE (HCC): ICD-10-CM

## 2024-01-10 DIAGNOSIS — J44.9 CHRONIC OBSTRUCTIVE PULMONARY DISEASE, UNSPECIFIED COPD TYPE (HCC): ICD-10-CM

## 2024-01-10 PROCEDURE — 1123F ACP DISCUSS/DSCN MKR DOCD: CPT | Performed by: NURSE PRACTITIONER

## 2024-01-10 PROCEDURE — 99214 OFFICE O/P EST MOD 30 MIN: CPT | Performed by: NURSE PRACTITIONER

## 2024-01-10 RX ORDER — MONTELUKAST SODIUM 10 MG/1
TABLET ORAL
Qty: 30 TABLET | Refills: 5 | Status: SHIPPED | OUTPATIENT
Start: 2024-01-10

## 2024-01-10 RX ORDER — TIOTROPIUM BROMIDE 18 UG/1
18 CAPSULE ORAL; RESPIRATORY (INHALATION) DAILY
Qty: 30 CAPSULE | Refills: 5 | Status: SHIPPED | OUTPATIENT
Start: 2024-01-10

## 2024-01-10 RX ORDER — FLUTICASONE PROPIONATE AND SALMETEROL XINAFOATE 115; 21 UG/1; UG/1
2 AEROSOL, METERED RESPIRATORY (INHALATION) 2 TIMES DAILY
Qty: 3 EACH | Refills: 3 | Status: SHIPPED | OUTPATIENT
Start: 2024-01-10

## 2024-01-10 RX ORDER — ERGOCALCIFEROL 1.25 MG/1
50000 CAPSULE ORAL WEEKLY
Qty: 12 CAPSULE | Refills: 1 | Status: SHIPPED | OUTPATIENT
Start: 2024-01-10

## 2024-01-10 RX ORDER — ATORVASTATIN CALCIUM 10 MG/1
TABLET, FILM COATED ORAL
Qty: 30 TABLET | Refills: 5 | Status: SHIPPED | OUTPATIENT
Start: 2024-01-10

## 2024-01-10 RX ORDER — TIOTROPIUM BROMIDE 18 UG/1
18 CAPSULE ORAL; RESPIRATORY (INHALATION) DAILY
Qty: 30 CAPSULE | Refills: 5 | Status: CANCELLED | OUTPATIENT
Start: 2024-01-10

## 2024-01-10 ASSESSMENT — ENCOUNTER SYMPTOMS
WHEEZING: 0
SORE THROAT: 0
VOMITING: 0
BACK PAIN: 0
SHORTNESS OF BREATH: 1
SINUS PRESSURE: 0
BLOOD IN STOOL: 0
CHEST TIGHTNESS: 0
VOICE CHANGE: 0
EYE PAIN: 0
ABDOMINAL PAIN: 0
FACIAL SWELLING: 0
RHINORRHEA: 0
TROUBLE SWALLOWING: 0
DIARRHEA: 0
SINUS PAIN: 0
ABDOMINAL DISTENTION: 0
EYE DISCHARGE: 0
COLOR CHANGE: 0
CONSTIPATION: 0
CHOKING: 0
EYE REDNESS: 0
PHOTOPHOBIA: 0
NAUSEA: 0
EYE ITCHING: 0
COUGH: 0

## 2024-01-10 ASSESSMENT — PATIENT HEALTH QUESTIONNAIRE - PHQ9
SUM OF ALL RESPONSES TO PHQ QUESTIONS 1-9: 0
SUM OF ALL RESPONSES TO PHQ9 QUESTIONS 1 & 2: 0
2. FEELING DOWN, DEPRESSED OR HOPELESS: 0
1. LITTLE INTEREST OR PLEASURE IN DOING THINGS: 0
SUM OF ALL RESPONSES TO PHQ QUESTIONS 1-9: 0

## 2024-01-10 NOTE — PROGRESS NOTES
1/10/24  Kelley Platt : 1950 Sex: female  Age: 73 y.o.    Chief Complaint   Patient presents with    3 Month Follow-Up     Patient refusing mammo and colon screening        Priti is seen today for a 3-month follow-up and a review of her overall condition and plan of care.  She reports that she has been pretty upset as she has gotten a paper from her insurance company stating that they would no longer cover her Symbicort and her Spiriva inhaler will cost more.  She states that these medications have done very well for her.  She states that she has been on Advair in the past, so I will switch her over to this, with hopes that this will manage her COPD and emphysema effectively.  She reports that she has been having some nasal congestion, but states it is no worse than normal.  She states it has not moved down into her chest.  Her lungs sound nice and clear today.  There is no wheezing, rales or rhonchi.  She denies any further acute issues at this time.  She denies any acute confusion, forgetfulness or any change in cognition.        Review of Systems   Constitutional:  Negative for appetite change, chills, diaphoresis, fatigue, fever and unexpected weight change.   HENT:  Positive for congestion (Nasal). Negative for ear pain, facial swelling, hearing loss, nosebleeds, postnasal drip, rhinorrhea, sinus pressure, sinus pain, sneezing, sore throat, tinnitus, trouble swallowing and voice change.    Eyes:  Negative for photophobia, pain, discharge, redness and itching.   Respiratory:  Positive for shortness of breath (No worse than normal.  She has a history of COPD and controlled on Symbicort and Spiriva). Negative for cough, choking, chest tightness and wheezing.    Cardiovascular:  Negative for chest pain, palpitations and leg swelling.   Gastrointestinal:  Negative for abdominal distention, abdominal pain, blood in stool, constipation, diarrhea, nausea and vomiting.   Endocrine: Negative for cold

## 2024-01-11 LAB
ABSOLUTE IMMATURE GRANULOCYTE: 0.03 K/UL (ref 0–0.58)
ALBUMIN SERPL-MCNC: 4.1 G/DL (ref 3.5–5.2)
ALP BLD-CCNC: 70 U/L (ref 35–104)
ALT SERPL-CCNC: 18 U/L (ref 0–32)
ANION GAP SERPL CALCULATED.3IONS-SCNC: 15 MMOL/L (ref 7–16)
AST SERPL-CCNC: 26 U/L (ref 0–31)
BASOPHILS ABSOLUTE: 0.03 K/UL (ref 0–0.2)
BASOPHILS RELATIVE PERCENT: 1 % (ref 0–2)
BILIRUB SERPL-MCNC: 0.3 MG/DL (ref 0–1.2)
BUN BLDV-MCNC: 8 MG/DL (ref 6–23)
CALCIUM SERPL-MCNC: 9.9 MG/DL (ref 8.6–10.2)
CHLORIDE BLD-SCNC: 106 MMOL/L (ref 98–107)
CHOLESTEROL: 190 MG/DL
CO2: 22 MMOL/L (ref 22–29)
CREAT SERPL-MCNC: 0.8 MG/DL (ref 0.5–1)
EOSINOPHILS ABSOLUTE: 0.02 K/UL (ref 0.05–0.5)
EOSINOPHILS RELATIVE PERCENT: 0 % (ref 0–6)
GFR SERPL CREATININE-BSD FRML MDRD: >60 ML/MIN/1.73M2
GLUCOSE BLD-MCNC: 95 MG/DL (ref 74–99)
HCT VFR BLD CALC: 45.9 % (ref 34–48)
HDLC SERPL-MCNC: 81 MG/DL
HEMOGLOBIN: 14.9 G/DL (ref 11.5–15.5)
IMMATURE GRANULOCYTES: 1 % (ref 0–5)
LDL CHOLESTEROL: 95 MG/DL
LYMPHOCYTES ABSOLUTE: 0.95 K/UL (ref 1.5–4)
LYMPHOCYTES RELATIVE PERCENT: 14 % (ref 20–42)
MCH RBC QN AUTO: 31.8 PG (ref 26–35)
MCHC RBC AUTO-ENTMCNC: 32.5 G/DL (ref 32–34.5)
MCV RBC AUTO: 97.9 FL (ref 80–99.9)
MONOCYTES ABSOLUTE: 0.41 K/UL (ref 0.1–0.95)
MONOCYTES RELATIVE PERCENT: 6 % (ref 2–12)
NEUTROPHILS ABSOLUTE: 5.15 K/UL (ref 1.8–7.3)
NEUTROPHILS RELATIVE PERCENT: 78 % (ref 43–80)
PDW BLD-RTO: 13.6 % (ref 11.5–15)
PLATELET # BLD: 259 K/UL (ref 130–450)
PMV BLD AUTO: 10.6 FL (ref 7–12)
POTASSIUM SERPL-SCNC: 4.3 MMOL/L (ref 3.5–5)
RBC # BLD: 4.69 M/UL (ref 3.5–5.5)
SODIUM BLD-SCNC: 143 MMOL/L (ref 132–146)
TOTAL PROTEIN: 7.2 G/DL (ref 6.4–8.3)
TRIGL SERPL-MCNC: 71 MG/DL
TSH SERPL DL<=0.05 MIU/L-ACNC: 1.97 UIU/ML (ref 0.27–4.2)
VITAMIN D 25-HYDROXY: 90.7 NG/ML (ref 30–100)
VLDLC SERPL CALC-MCNC: 14 MG/DL
WBC # BLD: 6.6 K/UL (ref 4.5–11.5)

## 2024-01-23 DIAGNOSIS — E78.5 HYPERLIPIDEMIA, UNSPECIFIED HYPERLIPIDEMIA TYPE: ICD-10-CM

## 2024-01-23 RX ORDER — ATORVASTATIN CALCIUM 10 MG/1
TABLET, FILM COATED ORAL
Qty: 90 TABLET | Refills: 1 | Status: SHIPPED | OUTPATIENT
Start: 2024-01-23

## 2024-04-10 ENCOUNTER — OFFICE VISIT (OUTPATIENT)
Dept: PRIMARY CARE CLINIC | Age: 74
End: 2024-04-10

## 2024-04-10 ENCOUNTER — OFFICE VISIT (OUTPATIENT)
Dept: PRIMARY CARE CLINIC | Age: 74
End: 2024-04-10
Payer: MEDICARE

## 2024-04-10 VITALS
BODY MASS INDEX: 18.73 KG/M2 | OXYGEN SATURATION: 95 % | TEMPERATURE: 97.9 F | DIASTOLIC BLOOD PRESSURE: 64 MMHG | TEMPERATURE: 97.9 F | BODY MASS INDEX: 18.73 KG/M2 | SYSTOLIC BLOOD PRESSURE: 148 MMHG | HEART RATE: 100 BPM | HEART RATE: 100 BPM | HEIGHT: 61 IN | DIASTOLIC BLOOD PRESSURE: 64 MMHG | WEIGHT: 99.2 LBS | HEIGHT: 61 IN | SYSTOLIC BLOOD PRESSURE: 148 MMHG | OXYGEN SATURATION: 95 % | WEIGHT: 99.2 LBS

## 2024-04-10 DIAGNOSIS — J43.9 PULMONARY EMPHYSEMA, UNSPECIFIED EMPHYSEMA TYPE (HCC): ICD-10-CM

## 2024-04-10 DIAGNOSIS — Z87.891 PERSONAL HISTORY OF TOBACCO USE: ICD-10-CM

## 2024-04-10 DIAGNOSIS — E55.9 VITAMIN D DEFICIENCY: ICD-10-CM

## 2024-04-10 DIAGNOSIS — J44.9 CHRONIC OBSTRUCTIVE PULMONARY DISEASE, UNSPECIFIED COPD TYPE (HCC): Primary | ICD-10-CM

## 2024-04-10 DIAGNOSIS — E78.5 HYPERLIPIDEMIA, UNSPECIFIED HYPERLIPIDEMIA TYPE: ICD-10-CM

## 2024-04-10 DIAGNOSIS — J44.9 CHRONIC OBSTRUCTIVE PULMONARY DISEASE, UNSPECIFIED COPD TYPE (HCC): ICD-10-CM

## 2024-04-10 DIAGNOSIS — Z00.00 MEDICARE ANNUAL WELLNESS VISIT, SUBSEQUENT: Primary | ICD-10-CM

## 2024-04-10 PROCEDURE — 1123F ACP DISCUSS/DSCN MKR DOCD: CPT | Performed by: NURSE PRACTITIONER

## 2024-04-10 PROCEDURE — G0296 VISIT TO DETERM LDCT ELIG: HCPCS | Performed by: NURSE PRACTITIONER

## 2024-04-10 PROCEDURE — G0439 PPPS, SUBSEQ VISIT: HCPCS | Performed by: NURSE PRACTITIONER

## 2024-04-10 RX ORDER — FLUTICASONE PROPIONATE AND SALMETEROL 250; 50 UG/1; UG/1
1 POWDER RESPIRATORY (INHALATION) EVERY 12 HOURS
Qty: 60 EACH | Refills: 3 | Status: SHIPPED | OUTPATIENT
Start: 2024-04-10

## 2024-04-10 RX ORDER — ALBUTEROL SULFATE 90 UG/1
2 AEROSOL, METERED RESPIRATORY (INHALATION) 4 TIMES DAILY PRN
Qty: 18 G | Refills: 5 | Status: SHIPPED | OUTPATIENT
Start: 2024-04-10

## 2024-04-10 ASSESSMENT — PATIENT HEALTH QUESTIONNAIRE - PHQ9
2. FEELING DOWN, DEPRESSED OR HOPELESS: NOT AT ALL
SUM OF ALL RESPONSES TO PHQ QUESTIONS 1-9: 0
SUM OF ALL RESPONSES TO PHQ9 QUESTIONS 1 & 2: 0
SUM OF ALL RESPONSES TO PHQ QUESTIONS 1-9: 0
1. LITTLE INTEREST OR PLEASURE IN DOING THINGS: NOT AT ALL

## 2024-04-10 ASSESSMENT — ENCOUNTER SYMPTOMS
WHEEZING: 0
SORE THROAT: 0
BLOOD IN STOOL: 0
BACK PAIN: 0
SINUS PRESSURE: 0
FACIAL SWELLING: 0
COLOR CHANGE: 0
RHINORRHEA: 0
EYE ITCHING: 0
SINUS PAIN: 0
TROUBLE SWALLOWING: 0
CHOKING: 0
PHOTOPHOBIA: 0
CHEST TIGHTNESS: 0
CONSTIPATION: 0
EYE DISCHARGE: 0
VOMITING: 0
ABDOMINAL PAIN: 0
EYE REDNESS: 0
SHORTNESS OF BREATH: 1
EYE PAIN: 0
DIARRHEA: 0
COUGH: 0
ABDOMINAL DISTENTION: 0
VOICE CHANGE: 0
NAUSEA: 0

## 2024-04-10 NOTE — PROGRESS NOTES
([facility])  Comments:  Chronic issue, stable at this time.  She received another letter from her insurance company stating that her inhaler was not on the formulary.  Orders:  -     albuterol sulfate HFA (VENTOLIN HFA) 108 (90 Base) MCG/ACT inhaler; Inhale 2 puffs into the lungs 4 times daily as needed for Wheezing  -     fluticasone-salmeterol (ADVAIR) 250-50 MCG/ACT AEPB diskus inhaler; Inhale 1 puff into the lungs in the morning and 1 puff in the evening.    Pulmonary emphysema, unspecified emphysema type ([facility])  Comments:  Chronic issue, controlled with inhaler, but no longer covered on the insurance's formulary  Orders:  -     albuterol sulfate HFA (VENTOLIN HFA) 108 (90 Base) MCG/ACT inhaler; Inhale 2 puffs into the lungs 4 times daily as needed for Wheezing  -     fluticasone-salmeterol (ADVAIR) 250-50 MCG/ACT AEPB diskus inhaler; Inhale 1 puff into the lungs in the morning and 1 puff in the evening.    Hyperlipidemia, unspecified hyperlipidemia type  Comments:  Chronic issue, stable on recent blood work    Vitamin D deficiency  Comments:  Chronic issue, controlled with supplementation    *Kelley is seen today for a 3-month follow-up and review of her overall condition and plan of care.  She is also here to discuss labs, that she has had drawn in January.  She received another letter from her insurance company stating that her maintenance inhaler for her COPD, is no longer on their formulary.  This frustrates her, as she had brought in a similar note, 3 months ago.  They did provide Wixela as an alternative that they do cover.  I will order this for her, today.  She states that she is continuing to have shortness of breath, but states it is no worse than normal.  She has seasonal allergies, but they are controlled with the montelukast.  She denies any further acute issues at this time.  She denies any acute confusion, forgetfulness or any change in cognition.    Discussions/Education provided to patients during

## 2024-04-10 NOTE — PROGRESS NOTES
Medicare Annual Wellness Visit    Kelley Platt is here for Medicare AWV    Assessment & Plan   Medicare annual wellness visit, subsequent  Personal history of tobacco use  -     [place] VISIT TO DISCUSS LUNG CA SCREEN W LDCT  -     CT Lung Screen (Initial/Annual/Baseline); Future  Chronic obstructive pulmonary disease, unspecified COPD type (HCC)  Pulmonary emphysema, unspecified emphysema type (HCC)  Hyperlipidemia, unspecified hyperlipidemia type  Vitamin D deficiency  Recommendations for Preventive Services Due: see orders and patient instructions/AVS.  Recommended screening schedule for the next 5-10 years is provided to the patient in written form: see Patient Instructions/AVS.     Return for Medicare Annual Wellness Visit in 1 year.     Subjective     Patient's complete Health Risk Assessment and screening values have been reviewed and are found in Flowsheets. The following problems were reviewed today and where indicated follow up appointments were made and/or referrals ordered.    Positive Risk Factor Screenings with Interventions:                   Vision Screen:  Do you have difficulty driving, watching TV, or doing any of your daily activities because of your eyesight?: No  Have you had an eye exam within the past year?: (!) No  No results found.    Interventions:   Patient comments: She reports that it has been 2 years, since she had cataracts removed.  Patient encouraged to make appointment with their eye specialist        Lung Cancer Screening:  LDCT Screening: Discussed with patient the benefits and harms of screening, follow-up diagnostic testing, over-diagnosis, false positive rate, and total radiation exposure. Counseled on the importance of adherence to annual lung cancer LDCT screening, impact of comorbidities, ability and willingness to undergo diagnosis and treatment. Counseled on the importance of maintaining cigarette smoking abstinence and cessation. The patient has a history of >20 pack years

## 2024-06-12 ENCOUNTER — TELEPHONE (OUTPATIENT)
Dept: PRIMARY CARE CLINIC | Age: 74
End: 2024-06-12

## 2024-06-12 DIAGNOSIS — J44.9 CHRONIC OBSTRUCTIVE PULMONARY DISEASE, UNSPECIFIED COPD TYPE (HCC): Primary | ICD-10-CM

## 2024-06-12 RX ORDER — FLUTICASONE FUROATE AND VILANTEROL 100; 25 UG/1; UG/1
1 POWDER RESPIRATORY (INHALATION) DAILY
Qty: 60 EACH | Refills: 3 | Status: SHIPPED | OUTPATIENT
Start: 2024-06-12 | End: 2024-07-12

## 2024-06-29 DIAGNOSIS — E55.9 VITAMIN D DEFICIENCY: ICD-10-CM

## 2024-07-01 RX ORDER — ERGOCALCIFEROL 1.25 MG/1
50000 CAPSULE ORAL WEEKLY
Qty: 12 CAPSULE | Refills: 1 | Status: SHIPPED | OUTPATIENT
Start: 2024-07-01

## 2024-07-01 NOTE — TELEPHONE ENCOUNTER
Patients last appointment 4/10/2024.  Patients next scheduled appointment   Future Appointments   Date Time Provider Department Center   7/10/2024 10:00 AM Raj Hernandes APRN - NP [facility]   4/11/2025 10:00 AM Raj Hernandes APRN - NP [facility]

## 2024-07-10 ENCOUNTER — OFFICE VISIT (OUTPATIENT)
Dept: PRIMARY CARE CLINIC | Age: 74
End: 2024-07-10

## 2024-07-10 VITALS
HEART RATE: 105 BPM | TEMPERATURE: 97.8 F | BODY MASS INDEX: 18.48 KG/M2 | OXYGEN SATURATION: 94 % | RESPIRATION RATE: 20 BRPM | SYSTOLIC BLOOD PRESSURE: 148 MMHG | WEIGHT: 97.8 LBS | DIASTOLIC BLOOD PRESSURE: 68 MMHG

## 2024-07-10 DIAGNOSIS — J44.9 CHRONIC OBSTRUCTIVE PULMONARY DISEASE, UNSPECIFIED COPD TYPE (HCC): Primary | ICD-10-CM

## 2024-07-10 DIAGNOSIS — E55.9 VITAMIN D DEFICIENCY: ICD-10-CM

## 2024-07-10 DIAGNOSIS — E78.5 HYPERLIPIDEMIA, UNSPECIFIED HYPERLIPIDEMIA TYPE: ICD-10-CM

## 2024-07-10 DIAGNOSIS — I10 ESSENTIAL (PRIMARY) HYPERTENSION: ICD-10-CM

## 2024-07-10 DIAGNOSIS — J43.9 PULMONARY EMPHYSEMA, UNSPECIFIED EMPHYSEMA TYPE (HCC): ICD-10-CM

## 2024-07-10 RX ORDER — TIOTROPIUM BROMIDE 18 UG/1
18 CAPSULE ORAL; RESPIRATORY (INHALATION) DAILY
Qty: 30 CAPSULE | Refills: 5 | Status: SHIPPED | OUTPATIENT
Start: 2024-07-10

## 2024-07-10 SDOH — ECONOMIC STABILITY: FOOD INSECURITY: WITHIN THE PAST 12 MONTHS, THE FOOD YOU BOUGHT JUST DIDN'T LAST AND YOU DIDN'T HAVE MONEY TO GET MORE.: NEVER TRUE

## 2024-07-10 SDOH — ECONOMIC STABILITY: FOOD INSECURITY: WITHIN THE PAST 12 MONTHS, YOU WORRIED THAT YOUR FOOD WOULD RUN OUT BEFORE YOU GOT MONEY TO BUY MORE.: NEVER TRUE

## 2024-07-10 SDOH — ECONOMIC STABILITY: INCOME INSECURITY: HOW HARD IS IT FOR YOU TO PAY FOR THE VERY BASICS LIKE FOOD, HOUSING, MEDICAL CARE, AND HEATING?: NOT HARD AT ALL

## 2024-07-10 ASSESSMENT — ENCOUNTER SYMPTOMS
BACK PAIN: 0
NAUSEA: 0
EYE DISCHARGE: 0
CONSTIPATION: 0
BLOOD IN STOOL: 0
RHINORRHEA: 0
WHEEZING: 0
SORE THROAT: 0
COLOR CHANGE: 0
EYE ITCHING: 0
CHOKING: 0
PHOTOPHOBIA: 0
EYE REDNESS: 0
COUGH: 0
FACIAL SWELLING: 0
ABDOMINAL DISTENTION: 0
SINUS PRESSURE: 0
EYE PAIN: 0
VOMITING: 0
VOICE CHANGE: 0
TROUBLE SWALLOWING: 0
CHEST TIGHTNESS: 0
SINUS PAIN: 0
ABDOMINAL PAIN: 0
SHORTNESS OF BREATH: 1
DIARRHEA: 0

## 2024-07-10 NOTE — PROGRESS NOTES
7/10/24  Kelley Platt : 1950 Sex: female  Age: 74 y.o.    Chief Complaint   Patient presents with    3 Month Follow-Up    COPD     Worse today with heat and humidity         Priti is seen today for a 3-month follow-up and a review of her overall condition and plan of care.  She reports due to the heat and humidity she has been having increased shortness of breath today.  Her oxygen saturation was 94% on room air and she states that that is great for summertime.  She feels that the Breo inhaler has fairly well-controlled her COPD, as the previous medicine (Advair), was ineffective and made her cough more.  She has not coughed, since being off the Advair.  Lastly, her blood pressure is noted to be elevated, again today.  She feels that this is due to her difficulty breathing and that her pharmacy is closing.  She states that this is very stressful for her.  She does not want to be on any blood pressure medicine at this time and would like to have this reevaluated in October, when the summer is over.  She denies any further acute issues at this time.  She denies any acute confusion, forgetfulness or any change in cognition.        Review of Systems   Constitutional:  Negative for appetite change, chills, diaphoresis, fatigue, fever and unexpected weight change.   HENT:  Negative for congestion, ear pain, facial swelling, hearing loss, nosebleeds, postnasal drip, rhinorrhea, sinus pressure, sinus pain, sneezing, sore throat, tinnitus, trouble swallowing and voice change.    Eyes:  Negative for photophobia, pain, discharge, redness and itching.   Respiratory:  Positive for shortness of breath (She reports that due to the increased heat and humidity, she is having increased SOB.  She reports 94% is great for summertime). Negative for cough (Not since stopping the Advair), choking, chest tightness and wheezing.    Cardiovascular:  Negative for chest pain, palpitations and leg swelling.   Gastrointestinal:

## 2024-09-08 ENCOUNTER — HOSPITAL ENCOUNTER (INPATIENT)
Facility: HOSPITAL | Age: 74
End: 2024-09-08
Attending: STUDENT IN AN ORGANIZED HEALTH CARE EDUCATION/TRAINING PROGRAM | Admitting: HOSPITALIST
Payer: MEDICARE

## 2024-09-08 ENCOUNTER — APPOINTMENT (OUTPATIENT)
Dept: CARDIOLOGY | Facility: HOSPITAL | Age: 74
DRG: 177 | End: 2024-09-08
Payer: MEDICARE

## 2024-09-08 ENCOUNTER — APPOINTMENT (OUTPATIENT)
Dept: RADIOLOGY | Facility: HOSPITAL | Age: 74
DRG: 177 | End: 2024-09-08
Payer: MEDICARE

## 2024-09-08 VITALS
BODY MASS INDEX: 18.23 KG/M2 | TEMPERATURE: 98.5 F | HEIGHT: 61 IN | OXYGEN SATURATION: 90 % | WEIGHT: 96.56 LBS | RESPIRATION RATE: 18 BRPM | DIASTOLIC BLOOD PRESSURE: 58 MMHG | SYSTOLIC BLOOD PRESSURE: 114 MMHG | HEART RATE: 87 BPM

## 2024-09-08 DIAGNOSIS — R09.02 HYPOXIA: Primary | ICD-10-CM

## 2024-09-08 DIAGNOSIS — J18.9 PNEUMONIA OF LEFT LOWER LOBE DUE TO INFECTIOUS ORGANISM: ICD-10-CM

## 2024-09-08 DIAGNOSIS — U07.1 COVID-19: ICD-10-CM

## 2024-09-08 PROBLEM — H90.3 SENSORINEURAL HEARING LOSS (SNHL) OF BOTH EARS: Status: ACTIVE | Noted: 2022-10-12

## 2024-09-08 PROBLEM — E55.9 VITAMIN D DEFICIENCY: Status: ACTIVE | Noted: 2023-06-06

## 2024-09-08 PROBLEM — J44.9 CHRONIC OBSTRUCTIVE PULMONARY DISEASE (MULTI): Status: ACTIVE | Noted: 2018-12-22

## 2024-09-08 PROBLEM — E78.5 HYPERLIPIDEMIA: Status: ACTIVE | Noted: 2023-06-06

## 2024-09-08 LAB
ALBUMIN SERPL BCP-MCNC: 3.7 G/DL (ref 3.4–5)
ALP SERPL-CCNC: 65 U/L (ref 33–136)
ALT SERPL W P-5'-P-CCNC: 9 U/L (ref 7–45)
ANION GAP BLDV CALCULATED.4IONS-SCNC: 11 MMOL/L (ref 10–25)
ANION GAP SERPL CALC-SCNC: 15 MMOL/L (ref 10–20)
AST SERPL W P-5'-P-CCNC: 18 U/L (ref 9–39)
BASE EXCESS BLDV CALC-SCNC: 2.4 MMOL/L (ref -2–3)
BASOPHILS # BLD AUTO: 0.03 X10*3/UL (ref 0–0.1)
BASOPHILS NFR BLD AUTO: 0.2 %
BILIRUB SERPL-MCNC: 0.8 MG/DL (ref 0–1.2)
BNP SERPL-MCNC: 85 PG/ML (ref 0–99)
BODY TEMPERATURE: 37 DEGREES CELSIUS
BUN SERPL-MCNC: 7 MG/DL (ref 6–23)
CA-I BLDV-SCNC: 1.17 MMOL/L (ref 1.1–1.33)
CALCIUM SERPL-MCNC: 9.2 MG/DL (ref 8.6–10.3)
CARDIAC TROPONIN I PNL SERPL HS: 7 NG/L (ref 0–13)
CARDIAC TROPONIN I PNL SERPL HS: 7 NG/L (ref 0–13)
CHLORIDE BLDV-SCNC: 98 MMOL/L (ref 98–107)
CHLORIDE SERPL-SCNC: 99 MMOL/L (ref 98–107)
CO2 SERPL-SCNC: 26 MMOL/L (ref 21–32)
CREAT SERPL-MCNC: 0.65 MG/DL (ref 0.5–1.05)
EGFRCR SERPLBLD CKD-EPI 2021: >90 ML/MIN/1.73M*2
EOSINOPHIL # BLD AUTO: 0.01 X10*3/UL (ref 0–0.4)
EOSINOPHIL NFR BLD AUTO: 0.1 %
ERYTHROCYTE [DISTWIDTH] IN BLOOD BY AUTOMATED COUNT: 13.8 % (ref 11.5–14.5)
GLUCOSE BLDV-MCNC: 114 MG/DL (ref 74–99)
GLUCOSE SERPL-MCNC: 107 MG/DL (ref 74–99)
HCO3 BLDV-SCNC: 27.9 MMOL/L (ref 22–26)
HCT VFR BLD AUTO: 46 % (ref 36–46)
HCT VFR BLD EST: 49 % (ref 36–46)
HGB BLD-MCNC: 15.5 G/DL (ref 12–16)
HGB BLDV-MCNC: 16.2 G/DL (ref 12–16)
IMM GRANULOCYTES # BLD AUTO: 0.1 X10*3/UL (ref 0–0.5)
IMM GRANULOCYTES NFR BLD AUTO: 0.8 % (ref 0–0.9)
INHALED O2 CONCENTRATION: 21 %
LACTATE BLDV-SCNC: 1.8 MMOL/L (ref 0.4–2)
LACTATE BLDV-SCNC: 2.1 MMOL/L (ref 0.4–2)
LYMPHOCYTES # BLD AUTO: 0.86 X10*3/UL (ref 0.8–3)
LYMPHOCYTES NFR BLD AUTO: 6.6 %
MCH RBC QN AUTO: 30.7 PG (ref 26–34)
MCHC RBC AUTO-ENTMCNC: 33.7 G/DL (ref 32–36)
MCV RBC AUTO: 91 FL (ref 80–100)
MONOCYTES # BLD AUTO: 0.62 X10*3/UL (ref 0.05–0.8)
MONOCYTES NFR BLD AUTO: 4.8 %
NEUTROPHILS # BLD AUTO: 11.39 X10*3/UL (ref 1.6–5.5)
NEUTROPHILS NFR BLD AUTO: 87.5 %
NRBC BLD-RTO: 0 /100 WBCS (ref 0–0)
OXYHGB MFR BLDV: 17.7 % (ref 45–75)
PCO2 BLDV: 45 MM HG (ref 41–51)
PH BLDV: 7.4 PH (ref 7.33–7.43)
PLATELET # BLD AUTO: 472 X10*3/UL (ref 150–450)
PO2 BLDV: 17 MM HG (ref 35–45)
POTASSIUM BLDV-SCNC: 4.3 MMOL/L (ref 3.5–5.3)
POTASSIUM SERPL-SCNC: 4.5 MMOL/L (ref 3.5–5.3)
PROT SERPL-MCNC: 7.5 G/DL (ref 6.4–8.2)
RBC # BLD AUTO: 5.05 X10*6/UL (ref 4–5.2)
SAO2 % BLDV: 18 % (ref 45–75)
SARS-COV-2 RNA RESP QL NAA+PROBE: DETECTED
SODIUM BLDV-SCNC: 133 MMOL/L (ref 136–145)
SODIUM SERPL-SCNC: 135 MMOL/L (ref 136–145)
WBC # BLD AUTO: 13 X10*3/UL (ref 4.4–11.3)

## 2024-09-08 PROCEDURE — 84132 ASSAY OF SERUM POTASSIUM: CPT | Performed by: STUDENT IN AN ORGANIZED HEALTH CARE EDUCATION/TRAINING PROGRAM

## 2024-09-08 PROCEDURE — 84145 PROCALCITONIN (PCT): CPT | Mod: PORLAB

## 2024-09-08 PROCEDURE — 83880 ASSAY OF NATRIURETIC PEPTIDE: CPT | Performed by: STUDENT IN AN ORGANIZED HEALTH CARE EDUCATION/TRAINING PROGRAM

## 2024-09-08 PROCEDURE — 87635 SARS-COV-2 COVID-19 AMP PRB: CPT | Performed by: STUDENT IN AN ORGANIZED HEALTH CARE EDUCATION/TRAINING PROGRAM

## 2024-09-08 PROCEDURE — 96361 HYDRATE IV INFUSION ADD-ON: CPT

## 2024-09-08 PROCEDURE — 2500000001 HC RX 250 WO HCPCS SELF ADMINISTERED DRUGS (ALT 637 FOR MEDICARE OP)

## 2024-09-08 PROCEDURE — 96372 THER/PROPH/DIAG INJ SC/IM: CPT

## 2024-09-08 PROCEDURE — 94640 AIRWAY INHALATION TREATMENT: CPT

## 2024-09-08 PROCEDURE — 93005 ELECTROCARDIOGRAM TRACING: CPT

## 2024-09-08 PROCEDURE — 71046 X-RAY EXAM CHEST 2 VIEWS: CPT | Mod: FOREIGN READ | Performed by: RADIOLOGY

## 2024-09-08 PROCEDURE — 2500000002 HC RX 250 W HCPCS SELF ADMINISTERED DRUGS (ALT 637 FOR MEDICARE OP, ALT 636 FOR OP/ED)

## 2024-09-08 PROCEDURE — 36415 COLL VENOUS BLD VENIPUNCTURE: CPT | Performed by: STUDENT IN AN ORGANIZED HEALTH CARE EDUCATION/TRAINING PROGRAM

## 2024-09-08 PROCEDURE — 99223 1ST HOSP IP/OBS HIGH 75: CPT

## 2024-09-08 PROCEDURE — 71046 X-RAY EXAM CHEST 2 VIEWS: CPT

## 2024-09-08 PROCEDURE — 85025 COMPLETE CBC W/AUTO DIFF WBC: CPT | Performed by: STUDENT IN AN ORGANIZED HEALTH CARE EDUCATION/TRAINING PROGRAM

## 2024-09-08 PROCEDURE — 82435 ASSAY OF BLOOD CHLORIDE: CPT | Performed by: STUDENT IN AN ORGANIZED HEALTH CARE EDUCATION/TRAINING PROGRAM

## 2024-09-08 PROCEDURE — 3E0DX3Z INTRODUCTION OF ANTI-INFLAMMATORY INTO MOUTH AND PHARYNX, EXTERNAL APPROACH: ICD-10-PCS | Performed by: STUDENT IN AN ORGANIZED HEALTH CARE EDUCATION/TRAINING PROGRAM

## 2024-09-08 PROCEDURE — 99285 EMERGENCY DEPT VISIT HI MDM: CPT

## 2024-09-08 PROCEDURE — 83605 ASSAY OF LACTIC ACID: CPT | Performed by: STUDENT IN AN ORGANIZED HEALTH CARE EDUCATION/TRAINING PROGRAM

## 2024-09-08 PROCEDURE — 87449 NOS EACH ORGANISM AG IA: CPT | Mod: PORLAB

## 2024-09-08 PROCEDURE — 1200000002 HC GENERAL ROOM WITH TELEMETRY DAILY

## 2024-09-08 PROCEDURE — 2500000004 HC RX 250 GENERAL PHARMACY W/ HCPCS (ALT 636 FOR OP/ED): Performed by: STUDENT IN AN ORGANIZED HEALTH CARE EDUCATION/TRAINING PROGRAM

## 2024-09-08 PROCEDURE — 87899 AGENT NOS ASSAY W/OPTIC: CPT | Mod: PORLAB

## 2024-09-08 PROCEDURE — 84484 ASSAY OF TROPONIN QUANT: CPT | Performed by: STUDENT IN AN ORGANIZED HEALTH CARE EDUCATION/TRAINING PROGRAM

## 2024-09-08 PROCEDURE — 2500000004 HC RX 250 GENERAL PHARMACY W/ HCPCS (ALT 636 FOR OP/ED)

## 2024-09-08 PROCEDURE — 96374 THER/PROPH/DIAG INJ IV PUSH: CPT | Mod: 59

## 2024-09-08 PROCEDURE — 96375 TX/PRO/DX INJ NEW DRUG ADDON: CPT

## 2024-09-08 RX ORDER — ONDANSETRON HYDROCHLORIDE 2 MG/ML
4 INJECTION, SOLUTION INTRAVENOUS EVERY 6 HOURS PRN
Status: ACTIVE | OUTPATIENT
Start: 2024-09-08

## 2024-09-08 RX ORDER — FLUTICASONE FUROATE AND VILANTEROL TRIFENATATE 100; 25 UG/1; UG/1
1 POWDER RESPIRATORY (INHALATION) DAILY
Status: ON HOLD | COMMUNITY
Start: 2024-08-15

## 2024-09-08 RX ORDER — PANTOPRAZOLE SODIUM 40 MG/1
40 TABLET, DELAYED RELEASE ORAL
Status: ACTIVE | OUTPATIENT
Start: 2024-09-09

## 2024-09-08 RX ORDER — HEPARIN SODIUM 5000 [USP'U]/ML
5000 INJECTION, SOLUTION INTRAVENOUS; SUBCUTANEOUS EVERY 8 HOURS SCHEDULED
Status: DISPENSED | OUTPATIENT
Start: 2024-09-08

## 2024-09-08 RX ORDER — FLUTICASONE FUROATE AND VILANTEROL 100; 25 UG/1; UG/1
1 POWDER RESPIRATORY (INHALATION) DAILY
Status: DISPENSED | OUTPATIENT
Start: 2024-09-08

## 2024-09-08 RX ORDER — GUAIFENESIN 600 MG/1
600 TABLET, EXTENDED RELEASE ORAL 2 TIMES DAILY
Status: DISPENSED | OUTPATIENT
Start: 2024-09-08

## 2024-09-08 RX ORDER — TIOTROPIUM BROMIDE 18 UG/1
1 CAPSULE ORAL; RESPIRATORY (INHALATION)
Status: ON HOLD | COMMUNITY
Start: 2016-09-19

## 2024-09-08 RX ORDER — POLYETHYLENE GLYCOL 3350 17 G/17G
17 POWDER, FOR SOLUTION ORAL DAILY
Status: DISPENSED | OUTPATIENT
Start: 2024-09-08

## 2024-09-08 RX ORDER — BISACODYL 10 MG/1
10 SUPPOSITORY RECTAL DAILY PRN
Status: ACTIVE | OUTPATIENT
Start: 2024-09-08

## 2024-09-08 RX ORDER — FLUTICASONE PROPIONATE AND SALMETEROL 250; 50 UG/1; UG/1
1 POWDER RESPIRATORY (INHALATION)
Status: ON HOLD | COMMUNITY
Start: 2024-05-23

## 2024-09-08 RX ORDER — CEFTRIAXONE 1 G/50ML
1 INJECTION, SOLUTION INTRAVENOUS ONCE
Status: COMPLETED | OUTPATIENT
Start: 2024-09-08 | End: 2024-09-08

## 2024-09-08 RX ORDER — MONTELUKAST SODIUM 10 MG/1
10 TABLET ORAL DAILY
Status: DISPENSED | OUTPATIENT
Start: 2024-09-08

## 2024-09-08 RX ORDER — ACETAMINOPHEN 325 MG/1
650 TABLET ORAL EVERY 4 HOURS PRN
Status: ACTIVE | OUTPATIENT
Start: 2024-09-08

## 2024-09-08 RX ORDER — LEVOFLOXACIN 5 MG/ML
750 INJECTION, SOLUTION INTRAVENOUS EVERY 24 HOURS
Status: DISPENSED | OUTPATIENT
Start: 2024-09-08

## 2024-09-08 RX ORDER — IPRATROPIUM BROMIDE AND ALBUTEROL SULFATE 2.5; .5 MG/3ML; MG/3ML
3 SOLUTION RESPIRATORY (INHALATION) ONCE
Status: COMPLETED | OUTPATIENT
Start: 2024-09-08 | End: 2024-09-08

## 2024-09-08 RX ORDER — GUAIFENESIN 600 MG/1
600 TABLET, EXTENDED RELEASE ORAL EVERY 12 HOURS PRN
Status: DISCONTINUED | OUTPATIENT
Start: 2024-09-08 | End: 2024-09-08

## 2024-09-08 RX ORDER — MONTELUKAST SODIUM 10 MG/1
1 TABLET ORAL DAILY
Status: ON HOLD | COMMUNITY
Start: 2016-09-19

## 2024-09-08 RX ORDER — DEXAMETHASONE 4 MG/1
TABLET ORAL
Status: DISPENSED
Start: 2024-09-08 | End: 2024-09-09

## 2024-09-08 RX ORDER — ATORVASTATIN CALCIUM 10 MG/1
1 TABLET, FILM COATED ORAL DAILY
Status: ON HOLD | COMMUNITY
Start: 2016-09-19

## 2024-09-08 RX ORDER — TALC
3 POWDER (GRAM) TOPICAL NIGHTLY PRN
Status: ACTIVE | OUTPATIENT
Start: 2024-09-08

## 2024-09-08 RX ORDER — PANTOPRAZOLE SODIUM 40 MG/10ML
40 INJECTION, POWDER, LYOPHILIZED, FOR SOLUTION INTRAVENOUS
Status: ACTIVE | OUTPATIENT
Start: 2024-09-09

## 2024-09-08 RX ORDER — BISACODYL 5 MG
10 TABLET, DELAYED RELEASE (ENTERIC COATED) ORAL DAILY PRN
Status: ACTIVE | OUTPATIENT
Start: 2024-09-08

## 2024-09-08 RX ORDER — ATORVASTATIN CALCIUM 10 MG/1
10 TABLET, FILM COATED ORAL DAILY
Status: DISPENSED | OUTPATIENT
Start: 2024-09-08

## 2024-09-08 RX ORDER — IPRATROPIUM BROMIDE AND ALBUTEROL SULFATE 2.5; .5 MG/3ML; MG/3ML
SOLUTION RESPIRATORY (INHALATION)
Status: COMPLETED
Start: 2024-09-08 | End: 2024-09-08

## 2024-09-08 RX ADMIN — GUAIFENESIN 600 MG: 600 TABLET, EXTENDED RELEASE ORAL at 16:04

## 2024-09-08 RX ADMIN — SODIUM CHLORIDE 1000 ML: 9 INJECTION, SOLUTION INTRAVENOUS at 12:14

## 2024-09-08 RX ADMIN — IPRATROPIUM BROMIDE AND ALBUTEROL SULFATE 3 ML: 2.5; .5 SOLUTION RESPIRATORY (INHALATION) at 12:12

## 2024-09-08 RX ADMIN — MONTELUKAST 10 MG: 10 TABLET, FILM COATED ORAL at 14:44

## 2024-09-08 RX ADMIN — CEFTRIAXONE SODIUM 1 G: 1 INJECTION, SOLUTION INTRAVENOUS at 14:44

## 2024-09-08 RX ADMIN — HEPARIN SODIUM 5000 UNITS: 5000 INJECTION, SOLUTION INTRAVENOUS; SUBCUTANEOUS at 20:26

## 2024-09-08 RX ADMIN — HEPARIN SODIUM 5000 UNITS: 5000 INJECTION, SOLUTION INTRAVENOUS; SUBCUTANEOUS at 14:44

## 2024-09-08 RX ADMIN — LEVOFLOXACIN 750 MG: 750 INJECTION, SOLUTION INTRAVENOUS at 17:43

## 2024-09-08 RX ADMIN — ATORVASTATIN CALCIUM 10 MG: 10 TABLET, FILM COATED ORAL at 20:26

## 2024-09-08 RX ADMIN — DEXAMETHASONE 10 MG: 4 TABLET ORAL at 12:08

## 2024-09-08 RX ADMIN — AZITHROMYCIN MONOHYDRATE 500 MG: 500 INJECTION, POWDER, LYOPHILIZED, FOR SOLUTION INTRAVENOUS at 16:04

## 2024-09-08 SDOH — SOCIAL STABILITY: SOCIAL INSECURITY: HAS ANYONE EVER THREATENED TO HURT YOUR FAMILY OR YOUR PETS?: NO

## 2024-09-08 SDOH — SOCIAL STABILITY: SOCIAL INSECURITY: ABUSE: ADULT

## 2024-09-08 SDOH — SOCIAL STABILITY: SOCIAL INSECURITY: DO YOU FEEL UNSAFE GOING BACK TO THE PLACE WHERE YOU ARE LIVING?: NO

## 2024-09-08 SDOH — SOCIAL STABILITY: SOCIAL INSECURITY: ARE YOU OR HAVE YOU BEEN THREATENED OR ABUSED PHYSICALLY, EMOTIONALLY, OR SEXUALLY BY ANYONE?: NO

## 2024-09-08 SDOH — SOCIAL STABILITY: SOCIAL INSECURITY: DOES ANYONE TRY TO KEEP YOU FROM HAVING/CONTACTING OTHER FRIENDS OR DOING THINGS OUTSIDE YOUR HOME?: NO

## 2024-09-08 SDOH — SOCIAL STABILITY: SOCIAL INSECURITY: HAVE YOU HAD ANY THOUGHTS OF HARMING ANYONE ELSE?: NO

## 2024-09-08 SDOH — SOCIAL STABILITY: SOCIAL INSECURITY: ARE THERE ANY APPARENT SIGNS OF INJURIES/BEHAVIORS THAT COULD BE RELATED TO ABUSE/NEGLECT?: NO

## 2024-09-08 SDOH — SOCIAL STABILITY: SOCIAL INSECURITY: DO YOU FEEL ANYONE HAS EXPLOITED OR TAKEN ADVANTAGE OF YOU FINANCIALLY OR OF YOUR PERSONAL PROPERTY?: NO

## 2024-09-08 SDOH — SOCIAL STABILITY: SOCIAL INSECURITY: HAVE YOU HAD THOUGHTS OF HARMING ANYONE ELSE?: NO

## 2024-09-08 ASSESSMENT — PAIN SCALES - GENERAL
PAINLEVEL_OUTOF10: 0 - NO PAIN
PAINLEVEL_OUTOF10: 0 - NO PAIN

## 2024-09-08 ASSESSMENT — ACTIVITIES OF DAILY LIVING (ADL)
PATIENT'S MEMORY ADEQUATE TO SAFELY COMPLETE DAILY ACTIVITIES?: YES
ADEQUATE_TO_COMPLETE_ADL: YES
TOILETING: INDEPENDENT
GROOMING: INDEPENDENT
JUDGMENT_ADEQUATE_SAFELY_COMPLETE_DAILY_ACTIVITIES: YES
HEARING - LEFT EAR: FUNCTIONAL
BATHING: INDEPENDENT
DRESSING YOURSELF: INDEPENDENT
LACK_OF_TRANSPORTATION: NO
HEARING - RIGHT EAR: FUNCTIONAL
WALKS IN HOME: INDEPENDENT
FEEDING YOURSELF: INDEPENDENT

## 2024-09-08 ASSESSMENT — COGNITIVE AND FUNCTIONAL STATUS - GENERAL
PATIENT BASELINE BEDBOUND: NO
DAILY ACTIVITIY SCORE: 24
MOBILITY SCORE: 24
DAILY ACTIVITIY SCORE: 24
MOBILITY SCORE: 24

## 2024-09-08 ASSESSMENT — ENCOUNTER SYMPTOMS
COUGH: 1
TREMORS: 0
VOMITING: 0
JOINT SWELLING: 0
ABDOMINAL PAIN: 0
APPETITE CHANGE: 1
LIGHT-HEADEDNESS: 0
CHEST TIGHTNESS: 0
WHEEZING: 0
DIARRHEA: 0
NAUSEA: 0
MYALGIAS: 0
FEVER: 0
WOUND: 0
CHILLS: 0
BACK PAIN: 0
SHORTNESS OF BREATH: 1
HEADACHES: 0
HEMATURIA: 0
CONSTIPATION: 0
WEAKNESS: 0
FLANK PAIN: 0
FATIGUE: 0
PALPITATIONS: 0

## 2024-09-08 ASSESSMENT — COLUMBIA-SUICIDE SEVERITY RATING SCALE - C-SSRS
1. IN THE PAST MONTH, HAVE YOU WISHED YOU WERE DEAD OR WISHED YOU COULD GO TO SLEEP AND NOT WAKE UP?: NO
6. HAVE YOU EVER DONE ANYTHING, STARTED TO DO ANYTHING, OR PREPARED TO DO ANYTHING TO END YOUR LIFE?: NO
2. HAVE YOU ACTUALLY HAD ANY THOUGHTS OF KILLING YOURSELF?: NO

## 2024-09-08 ASSESSMENT — LIFESTYLE VARIABLES
HOW MANY STANDARD DRINKS CONTAINING ALCOHOL DO YOU HAVE ON A TYPICAL DAY: PATIENT DOES NOT DRINK
HOW OFTEN DO YOU HAVE 6 OR MORE DRINKS ON ONE OCCASION: NEVER
SUBSTANCE_ABUSE_PAST_12_MONTHS: NO
SKIP TO QUESTIONS 9-10: 1
HOW OFTEN DO YOU HAVE A DRINK CONTAINING ALCOHOL: NEVER
AUDIT-C TOTAL SCORE: 0
AUDIT-C TOTAL SCORE: 0
PRESCIPTION_ABUSE_PAST_12_MONTHS: NO

## 2024-09-08 ASSESSMENT — PAIN - FUNCTIONAL ASSESSMENT: PAIN_FUNCTIONAL_ASSESSMENT: 0-10

## 2024-09-08 ASSESSMENT — PATIENT HEALTH QUESTIONNAIRE - PHQ9
2. FEELING DOWN, DEPRESSED OR HOPELESS: NOT AT ALL
1. LITTLE INTEREST OR PLEASURE IN DOING THINGS: NOT AT ALL
SUM OF ALL RESPONSES TO PHQ9 QUESTIONS 1 & 2: 0

## 2024-09-08 NOTE — ED PROVIDER NOTES
HPI   Chief Complaint   Patient presents with    Shortness of Breath       HPI: Patient is a 74-year-old female, former smoker with history of COPD, history of hyperlipidemia, she is presenting to the emergency department for shortness of breath.  Patient reports that she was diagnosed with COVID about a week ago.  Since then she has been having worsening shortness of breath particularly with exertion as well as a cough that was initially nonproductive but has now become productive.  No lightheadedness or dizziness, no chest pain or palpitations, no nausea or vomiting.  No recent travel or surgery.  No lower extremity swelling.  Patient is concerned she may have developed pneumonia      ROS: Complete 12 point review of systems performed, otherwise negative except as noted in the history of present illness    PMH: Reviewed, documented below in note. Pertinents in HPI  PSH: Reviewed and documented below in note. Pertinents in HPI  SH: Former tobacco, no illicits.  Not homeless.  Fam: Reviewed, noncontributory to patients current complaint  MEDS: Reviewed and documented below in note. Pertinents in HPI  ALLERGIES: Reviewed and documented below in note.            History provided by:  Patient   used: No                          Charlene Coma Scale Score: 15                  Patient History   Past Medical History:   Diagnosis Date    Other disorders of lung     Lung trouble    Personal history of other diseases of the respiratory system     History of bronchitis    Personal history of other diseases of the respiratory system     History of chronic obstructive lung disease    Personal history of other diseases of the respiratory system     History of pulmonary emphysema    Personal history of other endocrine, nutritional and metabolic disease     History of high cholesterol    Personal history of other specified conditions     History of shortness of breath     Past Surgical History:   Procedure  "Laterality Date    OTHER SURGICAL HISTORY  12/19/2017    Ovarian Cystectomy     No family history on file.  Social History     Tobacco Use    Smoking status: Not on file    Smokeless tobacco: Not on file   Substance Use Topics    Alcohol use: Not on file    Drug use: Not on file       Physical Exam   Visit Vitals  /63 (BP Location: Right arm, Patient Position: Sitting)   Pulse 94   Temp 36.9 °C (98.5 °F) (Oral)   Resp 20   Ht 1.549 m (5' 1\")   Wt (!) 43.8 kg (96 lb 9 oz)   SpO2 (!) 74%   BMI 18.25 kg/m²   BSA 1.37 m²      Physical Exam  Vitals and nursing note reviewed.   Constitutional:       Appearance: Normal appearance.   HENT:      Head: Normocephalic and atraumatic.      Mouth/Throat:      Mouth: Mucous membranes are dry.      Pharynx: Oropharynx is clear.   Neck:      Vascular: No carotid bruit.   Cardiovascular:      Rate and Rhythm: Normal rate and regular rhythm.      Pulses: Normal pulses.      Heart sounds: Normal heart sounds.   Pulmonary:      Effort: Pulmonary effort is normal.      Comments: No accessory muscle use, diminished breath sounds throughout  Abdominal:      General: There is no distension.      Palpations: Abdomen is soft.      Tenderness: There is no abdominal tenderness. There is no guarding or rebound.   Musculoskeletal:         General: No tenderness, deformity or signs of injury.      Cervical back: Normal range of motion. No rigidity.      Right lower leg: No edema.      Left lower leg: No edema.   Skin:     General: Skin is warm and dry.      Capillary Refill: Capillary refill takes less than 2 seconds.   Neurological:      General: No focal deficit present.      Mental Status: She is alert and oriented to person, place, and time.      Sensory: No sensory deficit.      Motor: No weakness.   Psychiatric:         Mood and Affect: Mood normal.         Behavior: Behavior normal.         XR chest 2 views   Final Result   Small pleural effusions with patchy consolidation in the left " base.   Signed by Walker Vogt MD          Labs Reviewed   BLOOD GAS VENOUS FULL PANEL - Abnormal       Result Value    POCT pH, Venous 7.40      POCT pCO2, Venous 45      POCT pO2, Venous 17 (*)     POCT SO2, Venous 18 (*)     POCT Oxy Hemoglobin, Venous 17.7 (*)     POCT Hematocrit Calculated, Venous 49.0 (*)     POCT Sodium, Venous 133 (*)     POCT Potassium, Venous 4.3      POCT Chloride, Venous 98      POCT Ionized Calicum, Venous 1.17      POCT Glucose, Venous 114 (*)     POCT Lactate, Venous 2.1 (*)     POCT Base Excess, Venous 2.4      POCT HCO3 Calculated, Venous 27.9 (*)     POCT Hemoglobin, Venous 16.2 (*)     POCT Anion Gap, Venous 11.0      Patient Temperature 37.0      FiO2 21     CBC WITH AUTO DIFFERENTIAL - Abnormal    WBC 13.0 (*)     nRBC 0.0      RBC 5.05      Hemoglobin 15.5      Hematocrit 46.0      MCV 91      MCH 30.7      MCHC 33.7      RDW 13.8      Platelets 472 (*)     Neutrophils % 87.5      Immature Granulocytes %, Automated 0.8      Lymphocytes % 6.6      Monocytes % 4.8      Eosinophils % 0.1      Basophils % 0.2      Neutrophils Absolute 11.39 (*)     Immature Granulocytes Absolute, Automated 0.10      Lymphocytes Absolute 0.86      Monocytes Absolute 0.62      Eosinophils Absolute 0.01      Basophils Absolute 0.03     COMPREHENSIVE METABOLIC PANEL - Abnormal    Glucose 107 (*)     Sodium 135 (*)     Potassium 4.5      Chloride 99      Bicarbonate 26      Anion Gap 15      Urea Nitrogen 7      Creatinine 0.65      eGFR >90      Calcium 9.2      Albumin 3.7      Alkaline Phosphatase 65      Total Protein 7.5      AST 18      Bilirubin, Total 0.8      ALT 9     B-TYPE NATRIURETIC PEPTIDE - Normal    BNP 85      Narrative:        <100 pg/mL - Heart failure unlikely  100-299 pg/mL - Intermediate probability of acute heart                  failure exacerbation. Correlate with clinical                  context and patient history.    >=300 pg/mL - Heart Failure likely. Correlate with  clinical                  context and patient history.    BNP testing is performed using different testing methodology at [facility] than at other [facility]. Direct result comparisons should only be made within the same method.      SERIAL TROPONIN-INITIAL - Normal    Troponin I, High Sensitivity 7      Narrative:     Less than 99th percentile of normal range cutoff-  Female and children under 18 years old <14 ng/L; Male <21 ng/L: Negative  Repeat testing should be performed if clinically indicated.     Female and children under 18 years old 14-50 ng/L; Male 21-50 ng/L:  Consistent with possible cardiac damage and possible increased clinical   risk. Serial measurements may help to assess extent of myocardial damage.     >50 ng/L: Consistent with cardiac damage, increased clinical risk and  myocardial infarction. Serial measurements may help assess extent of   myocardial damage.      NOTE: Children less than 1 year old may have higher baseline troponin   levels and results should be interpreted in conjunction with the overall   clinical context.     NOTE: Troponin I testing is performed using a different   testing methodology at [facility] than at other   [facility]. Direct result comparisons should only   be made within the same method.   SERIAL TROPONIN, 1 HOUR - Normal    Troponin I, High Sensitivity 7      Narrative:     Less than 99th percentile of normal range cutoff-  Female and children under 18 years old <14 ng/L; Male <21 ng/L: Negative  Repeat testing should be performed if clinically indicated.     Female and children under 18 years old 14-50 ng/L; Male 21-50 ng/L:  Consistent with possible cardiac damage and possible increased clinical   risk. Serial measurements may help to assess extent of myocardial damage.     >50 ng/L: Consistent with cardiac damage, increased clinical risk and  myocardial infarction. Serial measurements may help assess extent of   myocardial  damage.      NOTE: Children less than 1 year old may have higher baseline troponin   levels and results should be interpreted in conjunction with the overall   clinical context.     NOTE: Troponin I testing is performed using a different   testing methodology at [facility] than at other   [facility] hospitals. Direct result comparisons should only   be made within the same method.   BLOOD GAS LACTIC ACID, VENOUS - Normal    POCT Lactate, Venous 1.8     RESPIRATORY CULTURE/SMEAR   LEGIONELLA ANTIGEN, URINE   STREPTOCOCCUS PNEUMONIAE ANTIGEN, URINE   TROPONIN SERIES- (INITIAL, 1 HR)    Narrative:     The following orders were created for panel order Troponin I Series, High Sensitivity (0, 1 HR).  Procedure                               Abnormality         Status                     ---------                               -----------         ------                     Troponin I, High Sensiti...[[id number]]  Normal              Final result               Troponin, High Sensitivi...[[id number]]  Normal              Final result                 Please view results for these tests on the individual orders.   SARS-COV-2 PCR   PROCALCITONIN         ED Course & MDM   Diagnoses as of 09/08/24 1429   Hypoxia   Pneumonia of left lower lobe due to infectious organism   COVID-19           Medical Decision Making  All mentioned lab results, ECGs, and imaging were independently reviewed by myself  - Patient evaluated. Patient is presenting to the emergency department for shortness of breath in the setting of a recent COVID diagnosis.  Differential includes but is not limited to worsening viral pneumonia, developing bacterial pneumonia, developing heart failure, ACS, myocarditis, cardiomyopathy, COPD exacerbation and PE also on the differential.  She was started on breathing treatments and steroids and IV fluids were given.  Cardiac enzymes are within normal limits.  EKG demonstrates sinus rhythm with ventricular rate of 95,  normal axis, normal intervals, no evidence of an acute STEMI.  She does have a mild leukocytosis with a left shift, no bandemia.  Her initial lactate was slightly elevated at 2.1.  Chest x-ray does show some concern for patchy consolidation in the left base small pleural effusions.  She was started on IV antibiotics.  Patient on ambulatory pulse ox drops to 74% and becomes very dyspneic.  We will admit the patient to medicine for management of her acute hypoxic respiratory failure in the setting of likely both COVID and a bacterial pneumonia    - Monitored for any changes in stability or symptomatology. Patient remained stable.   - Counseled regarding labs, imaging, diagnosis, and plan. Patient was agreeable. All questions were answered. The patient was receptive and agreeable to the plan of care.       *Disclaimer: This note was dictated by speech recognition. Minor errors in transcription may be present. Please call with questions.    Gus Earl MD             Your medication list        ASK your doctor about these medications        Instructions Last Dose Given Next Dose Due   atorvastatin 10 mg tablet  Commonly known as: Lipitor           Breo Ellipta 100-25 mcg/dose inhaler  Generic drug: fluticasone furoate-vilanteroL           fluticasone propion-salmeteroL 250-50 mcg/dose diskus inhaler  Commonly known as: Advair Diskus           montelukast 10 mg tablet  Commonly known as: Singulair           Spiriva with HandiHaler 18 mcg inhalation capsule  Generic drug: tiotropium                    Procedure  Procedures     *This report was transcribed using voice recognition software.  Every effort was made to ensure accuracy; however, inadvertent computerized transcription errors may be present.*  Edil Earl MD  09/08/24         Edil Earl MD  09/08/24 [id number]

## 2024-09-08 NOTE — CARE PLAN
The patient's goals for the shift include      The clinical goals for the shift include maintain PO above 92%

## 2024-09-08 NOTE — H&P
[facility] - GENERAL MEDICINE HISTORY AND PHYSICAL    History Obtained From: Patient  Collateral History: Chart review    History Of Present Illness:  Megan Way is a 74 y.o. female with PMHx s/f COPD (not on home oxygen), pulmonary emphysema, HTN, HLD, vitamin D deficiency presenting with shortness of breath. She states she was tested positive for COVID about a week ago and has been having increasing shortness of breath with exertion and productive cough (initially nonproductive).  She is unsure of what color the sputum is.  She has not taken any medications to alleviate her symptoms.  She takes Spiriva in the morning and Breo Ellipta at night for her COPD.  She has decreased appetite ever since her symptoms started. She denies fever and chills, nausea and vomiting, lightheadedness, headache, chest pain, abdominal pain, changes in urinary and bowel symptoms, myalgia.    ED Course (Summary):   Vitals on presentation: 98.5F, 72 bpm, 22 RR, 144/80, 94% on RA --> 74% with exertion  Labs: CMP-glucose 107, sodium 135  BNP 85, troponin negative  CBC-WBC 13.0, platelets 572, neutrophils 11.39  VBG-pH 7.40, pCO2 45, pO2 17, HCO3 27.9, lactate 2.1>>1.8  Imaging: CXR-Small pleural effusions with patchy consolidation in the left base   Interventions: NS 1 L, DuoNeb 3 mg, Decadron 10 mg, Rocephin and azithromycin in process, admission for further management    ED Course (From ED Provider):  Diagnoses as of 09/08/24 1426   Hypoxia   Pneumonia of left lower lobe due to infectious organism   COVID-19     Relevant Results  Results for orders placed or performed during the hospital encounter of 09/08/24 (from the past 24 hour(s))   Blood Gas, Venous Full Panel   Result Value Ref Range    POCT pH, Venous 7.40 7.33 - 7.43 pH    POCT pCO2, Venous 45 41 - 51 mm Hg    POCT pO2, Venous 17 (L) 35 - 45 mm Hg    POCT SO2, Venous 18 (L) 45 - 75 %    POCT Oxy Hemoglobin, Venous 17.7 (L) 45.0 - 75.0 %    POCT Hematocrit  Calculated, Venous 49.0 (H) 36.0 - 46.0 %    POCT Sodium, Venous 133 (L) 136 - 145 mmol/L    POCT Potassium, Venous 4.3 3.5 - 5.3 mmol/L    POCT Chloride, Venous 98 98 - 107 mmol/L    POCT Ionized Calicum, Venous 1.17 1.10 - 1.33 mmol/L    POCT Glucose, Venous 114 (H) 74 - 99 mg/dL    POCT Lactate, Venous 2.1 (H) 0.4 - 2.0 mmol/L    POCT Base Excess, Venous 2.4 -2.0 - 3.0 mmol/L    POCT HCO3 Calculated, Venous 27.9 (H) 22.0 - 26.0 mmol/L    POCT Hemoglobin, Venous 16.2 (H) 12.0 - 16.0 g/dL    POCT Anion Gap, Venous 11.0 10.0 - 25.0 mmol/L    Patient Temperature 37.0 degrees Celsius    FiO2 21 %   CBC and Auto Differential   Result Value Ref Range    WBC 13.0 (H) 4.4 - 11.3 x10*3/uL    nRBC 0.0 0.0 - 0.0 /100 WBCs    RBC 5.05 4.00 - 5.20 x10*6/uL    Hemoglobin 15.5 12.0 - 16.0 g/dL    Hematocrit 46.0 36.0 - 46.0 %    MCV 91 80 - 100 fL    MCH 30.7 26.0 - 34.0 pg    MCHC 33.7 32.0 - 36.0 g/dL    RDW 13.8 11.5 - 14.5 %    Platelets 472 (H) 150 - 450 x10*3/uL    Neutrophils % 87.5 40.0 - 80.0 %    Immature Granulocytes %, Automated 0.8 0.0 - 0.9 %    Lymphocytes % 6.6 13.0 - 44.0 %    Monocytes % 4.8 2.0 - 10.0 %    Eosinophils % 0.1 0.0 - 6.0 %    Basophils % 0.2 0.0 - 2.0 %    Neutrophils Absolute 11.39 (H) 1.60 - 5.50 x10*3/uL    Immature Granulocytes Absolute, Automated 0.10 0.00 - 0.50 x10*3/uL    Lymphocytes Absolute 0.86 0.80 - 3.00 x10*3/uL    Monocytes Absolute 0.62 0.05 - 0.80 x10*3/uL    Eosinophils Absolute 0.01 0.00 - 0.40 x10*3/uL    Basophils Absolute 0.03 0.00 - 0.10 x10*3/uL   Comprehensive metabolic panel   Result Value Ref Range    Glucose 107 (H) 74 - 99 mg/dL    Sodium 135 (L) 136 - 145 mmol/L    Potassium 4.5 3.5 - 5.3 mmol/L    Chloride 99 98 - 107 mmol/L    Bicarbonate 26 21 - 32 mmol/L    Anion Gap 15 10 - 20 mmol/L    Urea Nitrogen 7 6 - 23 mg/dL    Creatinine 0.65 0.50 - 1.05 mg/dL    eGFR >90 >60 mL/min/1.73m*2    Calcium 9.2 8.6 - 10.3 mg/dL    Albumin 3.7 3.4 - 5.0 g/dL    Alkaline  Phosphatase 65 33 - 136 U/L    Total Protein 7.5 6.4 - 8.2 g/dL    AST 18 9 - 39 U/L    Bilirubin, Total 0.8 0.0 - 1.2 mg/dL    ALT 9 7 - 45 U/L   B-Type Natriuretic Peptide   Result Value Ref Range    BNP 85 0 - 99 pg/mL   Troponin I, High Sensitivity, Initial   Result Value Ref Range    Troponin I, High Sensitivity 7 0 - 13 ng/L   Troponin, High Sensitivity, 1 Hour   Result Value Ref Range    Troponin I, High Sensitivity 7 0 - 13 ng/L   Blood Gas Lactic Acid, Venous   Result Value Ref Range    POCT Lactate, Venous 1.8 0.4 - 2.0 mmol/L      XR chest 2 views    Result Date: 9/8/2024  STUDY: Chest Radiographs;  9/8/2024 12:40 PM INDICATION: Shortness of breath with cough. COMPARISON: None Available. ACCESSION NUMBER(S): [id number] ORDERING CLINICIAN: JOE CAST TECHNIQUE:  Frontal and lateral chest. FINDINGS: CARDIOMEDIASTINAL SILHOUETTE: Cardiomediastinal silhouette is normal in size and configuration.  LUNGS: Small pleural effusions with patchy consolidation in the left base. Emphysematous changes noted.  ABDOMEN: No remarkable upper abdominal findings.  BONES: No acute osseous changes.    Small pleural effusions with patchy consolidation in the left base. Signed by Walker Vogt MD    Scheduled medications:  atorvastatin, 10 mg, oral, Daily  azithromycin, 500 mg, intravenous, Once  cefTRIAXone, 1 g, intravenous, Once  dexAMETHasone, , ,   heparin (porcine), 5,000 Units, subcutaneous, q8h FRANCISCO JAVIER  levoFLOXacin, 750 mg, intravenous, q24h  montelukast, 10 mg, oral, Daily  [START ON 9/9/2024] pantoprazole, 40 mg, oral, Daily before breakfast   Or  [START ON 9/9/2024] pantoprazole, 40 mg, intravenous, Daily before breakfast  polyethylene glycol, 17 g, oral, Daily  tiotropium, 2 puff, inhalation, Daily      Continuous medications:     PRN medications:  PRN medications: acetaminophen, bisacodyl, bisacodyl, dexAMETHasone, guaiFENesin, melatonin, ondansetron, oxygen     Past Medical History  She has a past  medical history of Other disorders of lung, Personal history of other diseases of the respiratory system, Personal history of other diseases of the respiratory system, Personal history of other diseases of the respiratory system, Personal history of other endocrine, nutritional and metabolic disease, and Personal history of other specified conditions.    Surgical History  She has a past surgical history that includes Other surgical history (12/19/2017).     Social History  She has no history on file for tobacco use, alcohol use, and drug use.    Family History  No family history on file.    Allergies  Penicillins    Code Status  Full Code     Review of Systems   Constitutional:  Positive for appetite change. Negative for chills, fatigue and fever.   Respiratory:  Positive for cough and shortness of breath. Negative for chest tightness and wheezing.    Cardiovascular:  Negative for chest pain, palpitations and leg swelling.   Gastrointestinal:  Negative for abdominal pain, constipation, diarrhea, nausea and vomiting.   Genitourinary:  Negative for flank pain and hematuria.   Musculoskeletal:  Negative for back pain, joint swelling and myalgias.   Skin:  Negative for rash and wound.   Neurological:  Negative for tremors, syncope, weakness, light-headedness and headaches.       Last Recorded Vitals  /63 (BP Location: Right arm, Patient Position: Sitting)   Pulse 94   Temp 36.9 °C (98.5 °F) (Oral)   Resp 20   Wt (!) 43.8 kg (96 lb 9 oz)   SpO2 (!) 74%      Physical Exam:  Vital signs and nursing notes reviewed.   Constitutional: Pleasant and cooperative. Laying in bed in no acute distress. Conversant.  Speaking in full sentences.  Skin: Warm and dry; no obvious lesions, rashes, pallor, or jaundice. Good turgor.   Eyes: EOMI. Anicteric sclera.   ENT: Mucous membranes moist; no obvious injury or deformity appreciated.   Head and Neck: Normocephalic, atraumatic. ROM preserved.   Respiratory: Nonlabored on RA.  Lungs wheezing and rhonchi heard in bilateral bases. Chest rise is equal.  Cardiovascular: RRR. No gross murmur, gallop, or rub. Extremities are warm and well-perfused with good capillary refill (< 3 seconds). No chest wall tenderness.   GI: Abdomen soft, nontender, nondistended. No obvious organomegaly appreciated. Bowel sounds are present and normoactive.  : No CVA tenderness.   MSK: No gross abnormalities appreciated. No limitations to AROM/PROM appreciated.   Extremities: No cyanosis, edema, or clubbing evident. Neurovascularly intact.   Neuro: A&Ox3. CN 2-12 grossly intact. Able to respond to questions appropriately and clearly. No acute focal neurologic deficits appreciated.  Psych: Appropriate mood and behavior.    Assessment/Plan     74 y.o. female with PMHx s/f COPD (not on home oxygen), pulmonary emphysema, HTN, HLD, vitamin D deficiency presenting with shortness of breath.     COVID pneumonia of left lower lobe with hypoxia  -Chest XR - Small pleural effusions with patchy consolidation in the left base   -Ordered Rocephin and azithromycin in ED, but given her penicillin allergy, will be continuing with levofloxacin  -COVID swab positive  -Strep, legionella urine antigen  -Sputum culture  -Continue home inhaler Spiriva, Breo Ellipta  -Mucinex BID  -Pulmonary hygiene   -RT c/s appreciated    COPD  -continue home inhalers as above    Mild hyponatremia  -Presumed secondary to poor oral intake  -Receive NS 1 L in ED, recheck in a.m.    HTN  -elevated at 154/80 initially, stable currently  -not on meds    HLD  -not on meds  -in the process of getting lipid panel labs outpatient    Diet: cardiac  DVT Prophylaxis: heparin   Code Status: Full code     Bobby Garcia PA-C    Dragon dictation software was used to dictate this note and thus there may be minor errors in translation/transcription including garbled speech or misspellings. Please contact for clarification if needed.

## 2024-09-08 NOTE — CARE PLAN
The patient's goals for the shift include      The clinical goals for the shift include patient spo2 will remain above 90% during this shift      Problem: Pain - Adult  Goal: Verbalizes/displays adequate comfort level or baseline comfort level  Outcome: Progressing     Problem: Safety - Adult  Goal: Free from fall injury  Outcome: Progressing     Problem: Discharge Planning  Goal: Discharge to home or other facility with appropriate resources  Outcome: Progressing     Problem: Chronic Conditions and Co-morbidities  Goal: Patient's chronic conditions and co-morbidity symptoms are monitored and maintained or improved  Outcome: Progressing     Problem: Respiratory  Goal: Minimal/no exertional discomfort or dyspnea this shift  Outcome: Progressing  Goal: No signs of respiratory distress (eg. Use of accessory muscles. Peds grunting)  Outcome: Progressing

## 2024-09-09 LAB
ALBUMIN SERPL BCP-MCNC: 3.1 G/DL (ref 3.4–5)
ALP SERPL-CCNC: 55 U/L (ref 33–136)
ALT SERPL W P-5'-P-CCNC: 8 U/L (ref 7–45)
ANION GAP SERPL CALC-SCNC: 13 MMOL/L (ref 10–20)
AST SERPL W P-5'-P-CCNC: 14 U/L (ref 9–39)
BASOPHILS # BLD AUTO: 0.01 X10*3/UL (ref 0–0.1)
BASOPHILS NFR BLD AUTO: 0.1 %
BILIRUB SERPL-MCNC: 0.4 MG/DL (ref 0–1.2)
BUN SERPL-MCNC: 9 MG/DL (ref 6–23)
CALCIUM SERPL-MCNC: 8.4 MG/DL (ref 8.6–10.3)
CHLORIDE SERPL-SCNC: 104 MMOL/L (ref 98–107)
CO2 SERPL-SCNC: 23 MMOL/L (ref 21–32)
CREAT SERPL-MCNC: 0.52 MG/DL (ref 0.5–1.05)
EGFRCR SERPLBLD CKD-EPI 2021: >90 ML/MIN/1.73M*2
EOSINOPHIL # BLD AUTO: 0 X10*3/UL (ref 0–0.4)
EOSINOPHIL NFR BLD AUTO: 0 %
ERYTHROCYTE [DISTWIDTH] IN BLOOD BY AUTOMATED COUNT: 13.8 % (ref 11.5–14.5)
GLUCOSE SERPL-MCNC: 108 MG/DL (ref 74–99)
HCT VFR BLD AUTO: 37.3 % (ref 36–46)
HGB BLD-MCNC: 12.7 G/DL (ref 12–16)
IMM GRANULOCYTES # BLD AUTO: 0.06 X10*3/UL (ref 0–0.5)
IMM GRANULOCYTES NFR BLD AUTO: 0.7 % (ref 0–0.9)
LEGIONELLA AG UR QL: NEGATIVE
LYMPHOCYTES # BLD AUTO: 0.7 X10*3/UL (ref 0.8–3)
LYMPHOCYTES NFR BLD AUTO: 7.9 %
MCH RBC QN AUTO: 30.5 PG (ref 26–34)
MCHC RBC AUTO-ENTMCNC: 34 G/DL (ref 32–36)
MCV RBC AUTO: 90 FL (ref 80–100)
MONOCYTES # BLD AUTO: 0.17 X10*3/UL (ref 0.05–0.8)
MONOCYTES NFR BLD AUTO: 1.9 %
NEUTROPHILS # BLD AUTO: 7.92 X10*3/UL (ref 1.6–5.5)
NEUTROPHILS NFR BLD AUTO: 89.4 %
NRBC BLD-RTO: 0 /100 WBCS (ref 0–0)
PLATELET # BLD AUTO: 457 X10*3/UL (ref 150–450)
POTASSIUM SERPL-SCNC: 3.9 MMOL/L (ref 3.5–5.3)
PROCALCITONIN SERPL-MCNC: 0.12 NG/ML
PROT SERPL-MCNC: 6.6 G/DL (ref 6.4–8.2)
RBC # BLD AUTO: 4.16 X10*6/UL (ref 4–5.2)
S PNEUM AG UR QL: NEGATIVE
SODIUM SERPL-SCNC: 136 MMOL/L (ref 136–145)
WBC # BLD AUTO: 8.9 X10*3/UL (ref 4.4–11.3)

## 2024-09-09 PROCEDURE — 99233 SBSQ HOSP IP/OBS HIGH 50: CPT | Performed by: FAMILY MEDICINE

## 2024-09-09 PROCEDURE — 2500000001 HC RX 250 WO HCPCS SELF ADMINISTERED DRUGS (ALT 637 FOR MEDICARE OP)

## 2024-09-09 PROCEDURE — 80053 COMPREHEN METABOLIC PANEL: CPT

## 2024-09-09 PROCEDURE — 97530 THERAPEUTIC ACTIVITIES: CPT | Mod: GO | Performed by: OCCUPATIONAL THERAPIST

## 2024-09-09 PROCEDURE — 36415 COLL VENOUS BLD VENIPUNCTURE: CPT

## 2024-09-09 PROCEDURE — 97166 OT EVAL MOD COMPLEX 45 MIN: CPT | Mod: GO | Performed by: OCCUPATIONAL THERAPIST

## 2024-09-09 PROCEDURE — 1200000002 HC GENERAL ROOM WITH TELEMETRY DAILY

## 2024-09-09 PROCEDURE — 2500000004 HC RX 250 GENERAL PHARMACY W/ HCPCS (ALT 636 FOR OP/ED)

## 2024-09-09 PROCEDURE — 85025 COMPLETE CBC W/AUTO DIFF WBC: CPT

## 2024-09-09 PROCEDURE — 97161 PT EVAL LOW COMPLEX 20 MIN: CPT | Mod: GP

## 2024-09-09 PROCEDURE — 97530 THERAPEUTIC ACTIVITIES: CPT | Mod: GP

## 2024-09-09 RX ORDER — ERGOCALCIFEROL 1.25 MG/1
1.25 CAPSULE ORAL WEEKLY
COMMUNITY

## 2024-09-09 RX ADMIN — PANTOPRAZOLE SODIUM 40 MG: 40 TABLET, DELAYED RELEASE ORAL at 10:43

## 2024-09-09 RX ADMIN — TIOTROPIUM BROMIDE INHALATION SPRAY 2 PUFF: 3.12 SPRAY, METERED RESPIRATORY (INHALATION) at 05:28

## 2024-09-09 RX ADMIN — LEVOFLOXACIN 750 MG: 750 INJECTION, SOLUTION INTRAVENOUS at 14:52

## 2024-09-09 RX ADMIN — FLUTICASONE FUROATE AND VILANTEROL TRIFENATATE 1 PUFF: 100; 25 POWDER RESPIRATORY (INHALATION) at 21:29

## 2024-09-09 RX ADMIN — HEPARIN SODIUM 5000 UNITS: 5000 INJECTION, SOLUTION INTRAVENOUS; SUBCUTANEOUS at 14:52

## 2024-09-09 RX ADMIN — ATORVASTATIN CALCIUM 10 MG: 10 TABLET, FILM COATED ORAL at 21:29

## 2024-09-09 RX ADMIN — HEPARIN SODIUM 5000 UNITS: 5000 INJECTION, SOLUTION INTRAVENOUS; SUBCUTANEOUS at 05:28

## 2024-09-09 RX ADMIN — MONTELUKAST 10 MG: 10 TABLET, FILM COATED ORAL at 10:43

## 2024-09-09 RX ADMIN — FLUTICASONE FUROATE AND VILANTEROL TRIFENATATE 1 PUFF: 100; 25 POWDER RESPIRATORY (INHALATION) at 03:23

## 2024-09-09 ASSESSMENT — COGNITIVE AND FUNCTIONAL STATUS - GENERAL
MOBILITY SCORE: 18
WALKING IN HOSPITAL ROOM: A LITTLE
DRESSING REGULAR LOWER BODY CLOTHING: A LITTLE
DAILY ACTIVITIY SCORE: 24
MOVING TO AND FROM BED TO CHAIR: A LITTLE
MOBILITY SCORE: 24
DAILY ACTIVITIY SCORE: 21
CLIMB 3 TO 5 STEPS WITH RAILING: TOTAL
STANDING UP FROM CHAIR USING ARMS: A LITTLE
TOILETING: A LITTLE
HELP NEEDED FOR BATHING: A LITTLE

## 2024-09-09 ASSESSMENT — PAIN - FUNCTIONAL ASSESSMENT
PAIN_FUNCTIONAL_ASSESSMENT: 0-10
PAIN_FUNCTIONAL_ASSESSMENT: 0-10

## 2024-09-09 ASSESSMENT — PAIN SCALES - GENERAL
PAINLEVEL_OUTOF10: 0 - NO PAIN

## 2024-09-09 ASSESSMENT — ACTIVITIES OF DAILY LIVING (ADL)
ADL_ASSISTANCE: INDEPENDENT
ADL_ASSISTANCE: INDEPENDENT

## 2024-09-09 NOTE — PROGRESS NOTES
Physical Therapy    Physical Therapy Evaluation    Patient Name: Megan Way  MRN: [id number]  Today's Date: 9/9/2024   Time Calculation  Start Time: 1207  Stop Time: 1248  Time Calculation (min): 41 min  2331/2331-A    Assessment/Plan   PT Assessment  PT Assessment Results: Decreased endurance, Impaired balance, Decreased mobility  Rehab Prognosis: Excellent  Barriers to Discharge: None  Evaluation/Treatment Tolerance: Patient tolerated treatment well, Patient limited by fatigue  Medical Staff Made Aware: Yes  End of Session Communication: Bedside nurse, PCT/NA/CTA  Assessment Comment: Patient presents with COPD hx, new COVID and PNA dx, deficits in endurance with significant O2 desaturation during activity, and balance, requiring assistance for safe ambulation. She will benefit from PT intervention to monitor O2 with activity, train in assistive device use for safe return home.  End of Session Patient Position: Bed, 2 rail up, Alarm off, not on at start of session  IP OR SWING BED PT PLAN  Inpatient or Swing Bed: Inpatient     09/09/24 1207   PT Plan   Treatment/Interventions Bed mobility;Transfer training;Gait training;Stair training;Balance training;Endurance training;Therapeutic exercise;Therapeutic activity   PT Plan Ongoing PT   PT Frequency 5 times per week   PT Discharge Recommendations Low intensity level of continued care   Equipment Recommended upon Discharge Wheeled walker  (Patient does not have AD at home.)   PT Recommended Transfer Status Assist x1   PT - OK to Discharge Yes  (Once medically cleared.)       Subjective     Current Problem:  1. Hypoxia        2. Pneumonia of left lower lobe due to infectious organism        3. COVID-19          Patient Active Problem List   Diagnosis    Chronic obstructive pulmonary disease (Multi)    Hyperlipidemia    Sensorineural hearing loss (SNHL) of both ears    Vitamin D deficiency    Hypoxia       General Visit Information:  General  Reason for Referral: COVID  +, PNA, hypoxia, weakness.  Referred By: Bobby Garcia PA-C  Past Medical History Relevant to Rehab: 73 y/o female admitted with dx of COVID, PNA, and hypoxia. (PMHx: COPD without home O2, pulmonary emphysema, HTN.)  Family/Caregiver Present: No  Co-Treatment: OT  Co-Treatment Reason: To conserve energy with consideration of current medical status and prior medical hx.  Prior to Session Communication: Bedside nurse  Patient Position Received: Bed, 2 rail up, Alarm off, not on at start of session  General Comment: Patient alert, agreeable to participate in PT.    Home Living:  Home Living  Type of Home: House  Lives With: Siblings (Sister)  Home Adaptive Equipment: None  Home Layout: One level, Laundry main level  Home Access: Stairs to enter without rails  Entrance Stairs-Number of Steps: 3  Bathroom Shower/Tub: Walk-in shower  Bathroom Equipment: Built-in shower seat    Prior Level of Function:  Prior Function Per Pt/Caregiver Report  Level of [place]: Independent with ADLs and functional transfers, Independent with homemaking with ambulation  ADL Assistance: Independent  Homemaking Assistance: Independent  Ambulatory Assistance: Independent (without device)  Vocational: Retired    Precautions:  Precautions  Precautions Comment: Fall precautions. Monitor O2  and HR with activity, order currently for 3LO2 to maintain PSO2 92%.    Vital Signs:  Vital Signs  Vitals Session: During PT  Vital Signs Comment: PSO2 @ rest 85% on RA, . PSO2 after ambulating 15 feet on RA 79%, . Applied 2LO2 (order states up to 3L to maintain 92%), 93% after 2 minutes seated rest to recover with O2. Ambulated 15 feet with 2L, PSO2 82%. Productive cough, PSO2 increased to 93% in 2 minutes with VC PLB.  Objective     Pain:  Pain Assessment  Pain Assessment: 0-10  0-10 (Numeric) Pain Score: 0 - No pain    Cognition:  Cognition  Overall Cognitive Status: Within Functional Limits    General Assessments:  General  Observation  General Observation: Educated pt on potential benefits of supplemental O2 with mobility, muscle use of O2. VC occasionally for PLB as pt 25% time breathes in through her mouth though verbalizes understanding of pursed lip breathing technique. Instructed pt in benefits of sitting up in a chair (pt politely defers until later),  use of WW to assist balance as well as activity tolerance. Pt agreeable and willing to learn.   Activity Tolerance  Endurance: Tolerates less than 10 min exercise with changes in vital signs, Decreased tolerance for upright activites  Rate of Perceived Exertion (RPE): 7/10 Modified DAVID with ambulation  Sensation  Light Touch: No apparent deficits  Strength  Strength Comments: B LE WFL  Perception  Inattention/Neglect: Appears intact  Coordination  Movements are Fluid and Coordinated: Yes     Static Sitting Balance  Static Sitting-Balance Support: No upper extremity supported  Static Sitting-Level of Assistance: Close supervision  Dynamic Sitting Balance  Dynamic Sitting-Balance Support: No upper extremity supported  Dynamic Sitting-Level of Assistance: Close supervision  Static Standing Balance  Static Standing-Balance Support: Bilateral upper extremity supported  Static Standing-Level of Assistance: Minimum assistance    Functional Assessments:     Bed Mobility  Bed Mobility:  (SBA)  Transfers  Transfer:  (Sit <> stand CGA.)  Ambulation/Gait Training  Ambulation/Gait Training Performed:  (Ambulated 15 feet x 2 with HHA and MIN A.)          Extremity/Trunk Assessments:        RLE   RLE : Within Functional Limits  LLE   LLE : Within Functional Limits    Outcome Measures:     [facility] Basic Mobility  Turning from your back to your side while in a flat bed without using bedrails: None  Moving from lying on your back to sitting on the side of a flat bed without using bedrails: None  Moving to and from bed to chair (including a wheelchair): A little  Standing up from a chair using your  arms (e.g. wheelchair or bedside chair): A little  To walk in hospital room: A little  Climbing 3-5 steps with railing: Total  Basic Mobility - Total Score: 18                                                             Goals:  Encounter Problems       Encounter Problems (Active)       Pain - Adult          To improve endurance, balance for safe return home per pt goal:       Patient will ambulate with WW vs rollator with MOD I 45 feet, safely managing O2 tubing with PSO2 WFL.       Start:  09/09/24    Expected End:  09/23/24            Patient will negotiate 3 steps with SBA for safe entry to her home.       Start:  09/09/24    Expected End:  09/23/24            Patient will participate in seated progressing to standing activity (exercise/functional reach) as tolerated for 3 minutes with SBA while maintaining PSO2 and HR WFL.       Start:  09/09/24    Expected End:  09/23/24                 Education Documentation  Mobility Training, taught by Fannie Goff, PT at 9/9/2024  1:17 PM.  Learner: Patient  Readiness: Eager  Method: Explanation  Response: Verbalizes Understanding    Education Comments  No comments found.

## 2024-09-09 NOTE — PROGRESS NOTES
Occupational Therapy    Evaluation    Patient Name: Megan Way  MRN: [id number]  Today's Date: 9/9/2024  Time Calculation  Start Time: 1210  Stop Time: 1244  Time Calculation (min): 34 min  2331/2331-A    Assessment  IP OT Assessment  OT Assessment: pt. requiring O2 for mobility to keep O2 sats WNL, to amb. with FWW for safety  Prognosis: Good  Medical Staff Made Aware: Yes  End of Session Communication: Bedside nurse  End of Session Patient Position: Bed, 2 rail up, Alarm on       09/09/24 1210   Inpatient Plan   Equipment Recommended upon Discharge Wheeled walker   Treatment Interventions Functional transfer training   OT Frequency 3 times per week   OT - OK to Discharge Yes  ((when medically approp.))   OT Discharge Recommendations Low intensity level of continued care   OT Recommended Transfer Status Minimal assist     Subjective     Current Problem:  1. Hypoxia        2. Pneumonia of left lower lobe due to infectious organism        3. COVID-19            General:  General  Reason for Referral: COVID+ pna., hypoxia  Referred By: Jose  Past Medical History Relevant to Rehab: Hx. of COPD, emphysema, HTN  Family/Caregiver Present: No  Co-Treatment: PT  Co-Treatment Reason: to maximize safe moiblity  Prior to Session Communication: Bedside nurse  Patient Position Received: Bed, 2 rail up, Alarm on  General Comment: pt. on room air, COVID precautions room, occasional cough    Precautions:  Medical Precautions: Fall precautions, Oxygen therapy device and L/min  Precautions Comment: Oxygen applied during tx. 2/2 low O2 sats after min. mobility    Vital Signs:pulsox decreased to low 80s without O2 after min. Amb., with 2 mins. needed to recover to WNL sats.        Pain:  Pain Assessment  Pain Assessment: 0-10  0-10 (Numeric) Pain Score: 0 - No pain    Objective     Cognition:  Overall Cognitive Status: Within Functional Limits  Insight: Within function limits         Home Living:  Type of Home: House  Lives With:   (sister)  Home Adaptive Equipment: None  Home Layout: One level  Entrance Stairs-Number of Steps: 3 GARTH  Bathroom Shower/Tub: Walk-in shower  Bathroom Equipment: Built-in shower seat     Prior Function:  Level of [place]: Independent with ADLs and functional transfers, Independent with homemaking with ambulation  ADL Assistance: Independent  Homemaking Assistance: Independent  Ambulatory Assistance: Independent  Prior Function Comments: no home O2    IADL History:  Homemaking Responsibilities: Yes  Meal Prep Responsibility: Primary  Laundry Responsibility: Primary  Cleaning Responsibility: Primary    ADL:  LE Dressing Assistance: Stand by  Toileting Assistance with Device:  (Min.A HHA amb. to/from toilet)    Activity Tolerance:  Endurance: Tolerates less than 10 min exercise with changes in vital signs (O2 sats decreased to low 80s after min. activity without O2, with 2 mins. to recover sats to WNL, so 2 liters O2 added with further amb.)    Bed Mobility/Transfers:   Bed Mobility  Bed Mobility: Yes (SBA)  Transfers  Transfer:  (Min.A)    Ambulation/Gait Training:  Functional Mobility  Functional Mobility Performed:  (Min.A func. mobility without device, with pt. feeling shakey and weak.  FWW placed in room for future amb.)         Standing Balance:  Dynamic Standing Balance  Dynamic Standing-Balance Support:  (CGA)    Vision: Vision - Basic Assessment  Current Vision: No visual deficits        Sensation:  Light Touch: No apparent deficits    Strength:  Strength Comments: UEs WNL    Perception:  Inattention/Neglect: Appears intact    Coordination:  Movements are Fluid and Coordinated: Yes     Hand Function:  Hand Function  Gross Grasp: Functional      Outcome Measures: [facility] Daily Activity  Putting on and taking off regular lower body clothing: A little  Bathing (including washing, rinsing, drying): A little  Putting on and taking off regular upper body clothing: None  Toileting, which includes using toilet,  bedpan or urinal: A little  Taking care of personal grooming such as brushing teeth: None  Eating Meals: None  Daily Activity - Total Score: 21                       EDUCATION:     Education Documentation  ADL Training, taught by Gloria Catherine OT at 9/9/2024  4:13 PM.  Learner: Patient  Readiness: Acceptance  Method: Demonstration  Response: Needs Reinforcement  Comment: pursed lip breathing to increase O2 sats.    Education Comments  No comments found.        Goals:   Encounter Problems       Encounter Problems (Active)       EXERCISE/STRENGTHENING       Joaquin. UE ex. program with good breathing tech.  (Progressing)       Start:  09/09/24    Expected End:  09/16/24               MOBILITY       Demo. safe, supervised func. mobility with FWW with O2 PRN .  (Progressing)       Start:  09/09/24    Expected End:  09/16/24

## 2024-09-09 NOTE — PROGRESS NOTES
Megan Way is a 74 y.o. female on day 1 of admission presenting with Hypoxia.      Subjective   74 y.o. female with PMHx s/f COPD (not on home oxygen), pulmonary emphysema, HTN, HLD, vitamin D deficiency presenting with shortness of breath. She states she was tested positive for COVID about a week ago and has been having increasing shortness of breath with exertion and productive cough (initially nonproductive).  She is unsure of what color the sputum is.  She has not taken any medications to alleviate her symptoms.  She takes Spiriva in the morning and Breo Ellipta at night for her COPD.  She has decreased appetite ever since her symptoms started. She denies fever and chills, nausea and vomiting, lightheadedness, headache, chest pain, abdominal pain, changes in urinary and bowel symptoms, myalgia.     ED Course (Summary):   Vitals on presentation: 98.5F, 72 bpm, 22 RR, 144/80, 94% on RA --> 74% with exertion  Labs: CMP-glucose 107, sodium 135  BNP 85, troponin negative  CBC-WBC 13.0, platelets 572, neutrophils 11.39  VBG-pH 7.40, pCO2 45, pO2 17, HCO3 27.9, lactate 2.1>>1.8  Imaging: CXR-Small pleural effusions with patchy consolidation in the left base   Interventions: NS 1 L, DuoNeb 3 mg, Decadron 10 mg, Rocephin and azithromycin      9/9:              Today the patient is found awake alert and interactive appropriately.  She states that she is not feeling very well at all but not worse than presentation.  Of note today she has been placed on 2 L nasal cannula.  Does not use oxygen at home.  Describes her initial positive COVID test last Wednesday.  Likely is beyond the window for remdesivir at this point.  Continues on dexamethasone.  Does not have much appetite but denies any nausea vomiting or diarrhea.  Not really producing any sputum.  Urine antigens negative.  Continues on azithromycin and Levaquin.  Likely will require home oxygen evaluation tomorrow as long as no increased oxygen  requirement.      Review of Systems   Constitutional:  Positive for appetite change. Negative for chills, fatigue and fever.   Respiratory:  Positive for cough and shortness of breath. Negative for chest tightness and wheezing.    Cardiovascular:  Negative for chest pain, palpitations and leg swelling.   Gastrointestinal:  Negative for abdominal pain, constipation, diarrhea, nausea and vomiting.   Genitourinary:  Negative for flank pain and hematuria.   Musculoskeletal:  Negative for back pain, joint swelling and myalgias.   Skin:  Negative for rash and wound.   Neurological:  Negative for tremors, syncope, weakness, light-headedness and headaches.           Objective     Last Recorded Vitals  /75 (BP Location: Left arm, Patient Position: Lying)   Pulse 85   Temp 37.1 °C (98.7 °F)   Resp 20   Wt (!) 43.8 kg (96 lb 9 oz)   SpO2 91% Comment: lying inbed  Intake/Output last 3 Shifts:    Intake/Output Summary (Last 24 hours) at 9/9/2024 1810  Last data filed at 9/8/2024 2112  Gross per 24 hour   Intake 1100 ml   Output --   Net 1100 ml       Admission Weight  Weight: (!) 43.8 kg (96 lb 9 oz) (09/08/24 1148)    Daily Weight  09/08/24 : (!) 43.8 kg (96 lb 9 oz)    Image Results  ECG 12 lead  Sinus rhythm  Low voltage, extremity leads      Physical Exam  Constitutional: Pleasant and cooperative. Laying in bed in no acute distress. Conversant.  Speaking in full sentences.  Skin: Warm and dry; no obvious lesions, rashes, pallor, or jaundice. Good turgor.   Eyes: EOMI. Anicteric sclera.   ENT: Mucous membranes dry; no obvious injury or deformity appreciated.   Head and Neck: Normocephalic, atraumatic. ROM preserved.   Respiratory: Nonlabored on 2 L nasal cannula. Lungs with some dullness/decreased breath sounds left posterior bases but clear on the right.    Cardiovascular: RRR. No gross murmur, gallop, or rub. Extremities are warm and well-perfused with good capillary refill (< 3 seconds). No chest wall  tenderness.   GI: Abdomen soft, nontender, nondistended. No obvious organomegaly appreciated. Bowel sounds are present and normoactive.  : No CVA tenderness.   MSK: No gross abnormalities appreciated. No limitations to AROM/PROM appreciated.   Extremities: No cyanosis, edema, or clubbing evident. Neurovascularly intact.   Neuro: A&Ox3. CN 2-12 grossly intact. Able to respond to questions appropriately and clearly. No acute focal neurologic deficits appreciated.  Psych: Appropriate mood and behavior.  Pleasant and cooperative to exam      Relevant Results               Assessment/Plan                  Assessment & Plan  Hypoxia    COVID pneumonia of left lower lobe with hypoxia  -Chest XR - Small pleural effusions with patchy consolidation in the left base   -Ordered Rocephin and azithromycin in ED, but given her penicillin allergy, will be continuing with levofloxacin  -COVID swab positive  -Strep, legionella urine antigen  -Sputum culture  -Continue home inhaler Spiriva, Breo Ellipta  -Mucinex BID  -Pulmonary hygiene   -RT c/s appreciated  9/9: Today appears to require 2 L nasal cannula.  No respiratory distress.  Likely outside the window for remdesivir.  Continues on dexamethasone as well as Levaquin and azithromycin.  Urinary antigens negative.  Does not appear to be producing any sputum.     COPD  -continue home inhalers as above     Mild hyponatremia  -Presumed secondary to poor oral intake  -Receive NS 1 L in ED, recheck in a.m.     HTN  -elevated at 154/80 initially, stable currently  -not on meds     HLD  -not on meds  -in the process of getting lipid panel labs outpatient              Michael Jackson MD

## 2024-09-09 NOTE — PROGRESS NOTES
Megan Way is a 74 y.o. female admitted for Hypoxia. Pharmacy reviewed the patient's [id number] medications and allergies for accuracy.    The list below reflects the PTA list prior to pharmacy medication history. A summary a changes to the PTA medication list has been listed below. Please review each medication in order reconciliation for additional clarification and justification.    Source of information:  Pharmacy    Medications added:  Vitamin D2 50,000 units once a week    Medications modified:    Medications to be removed:  Advair Diskus 250/50mcg    Medications of concern:      Prior to Admission Medications   Prescriptions Last Dose Informant Patient Reported? Taking?   Breo Ellipta 100-25 mcg/dose inhaler   Yes Yes   Sig: Inhale 1 puff once daily.   Spiriva with HandiHaler 18 mcg inhalation capsule   Yes Yes   Sig: Place 1 capsule (18 mcg) into inhaler and inhale once daily.   atorvastatin (Lipitor) 10 mg tablet   Yes Yes   Sig: Take 1 tablet (10 mg) by mouth once daily.   fluticasone propion-salmeteroL (Advair Diskus) 250-50 mcg/dose diskus inhaler   Yes Yes   Sig: Inhale 1 puff 2 times a day.   montelukast (Singulair) 10 mg tablet   Yes Yes   Sig: Take 1 tablet (10 mg) by mouth once daily.      Facility-Administered Medications: None       Ayanna Lund

## 2024-09-10 ENCOUNTER — PHARMACY VISIT (OUTPATIENT)
Dept: PHARMACY | Facility: CLINIC | Age: 74
End: 2024-09-10
Payer: COMMERCIAL

## 2024-09-10 VITALS
RESPIRATION RATE: 18 BRPM | TEMPERATURE: 98.2 F | WEIGHT: 96.56 LBS | DIASTOLIC BLOOD PRESSURE: 72 MMHG | BODY MASS INDEX: 18.23 KG/M2 | SYSTOLIC BLOOD PRESSURE: 147 MMHG | HEART RATE: 98 BPM | OXYGEN SATURATION: 91 % | HEIGHT: 61 IN

## 2024-09-10 LAB
ANION GAP SERPL CALC-SCNC: 14 MMOL/L (ref 10–20)
BUN SERPL-MCNC: 14 MG/DL (ref 6–23)
CALCIUM SERPL-MCNC: 8.4 MG/DL (ref 8.6–10.3)
CHLORIDE SERPL-SCNC: 104 MMOL/L (ref 98–107)
CO2 SERPL-SCNC: 21 MMOL/L (ref 21–32)
CREAT SERPL-MCNC: 0.58 MG/DL (ref 0.5–1.05)
EGFRCR SERPLBLD CKD-EPI 2021: >90 ML/MIN/1.73M*2
ERYTHROCYTE [DISTWIDTH] IN BLOOD BY AUTOMATED COUNT: 13.8 % (ref 11.5–14.5)
GLUCOSE SERPL-MCNC: 91 MG/DL (ref 74–99)
HCT VFR BLD AUTO: 36.1 % (ref 36–46)
HGB BLD-MCNC: 12.1 G/DL (ref 12–16)
MCH RBC QN AUTO: 30.1 PG (ref 26–34)
MCHC RBC AUTO-ENTMCNC: 33.5 G/DL (ref 32–36)
MCV RBC AUTO: 90 FL (ref 80–100)
NRBC BLD-RTO: 0 /100 WBCS (ref 0–0)
PLATELET # BLD AUTO: 448 X10*3/UL (ref 150–450)
POTASSIUM SERPL-SCNC: 3.9 MMOL/L (ref 3.5–5.3)
RBC # BLD AUTO: 4.02 X10*6/UL (ref 4–5.2)
SODIUM SERPL-SCNC: 135 MMOL/L (ref 136–145)
WBC # BLD AUTO: 14.2 X10*3/UL (ref 4.4–11.3)

## 2024-09-10 PROCEDURE — 36415 COLL VENOUS BLD VENIPUNCTURE: CPT | Performed by: FAMILY MEDICINE

## 2024-09-10 PROCEDURE — RXMED WILLOW AMBULATORY MEDICATION CHARGE

## 2024-09-10 PROCEDURE — 2500000004 HC RX 250 GENERAL PHARMACY W/ HCPCS (ALT 636 FOR OP/ED)

## 2024-09-10 PROCEDURE — 80048 BASIC METABOLIC PNL TOTAL CA: CPT | Performed by: FAMILY MEDICINE

## 2024-09-10 PROCEDURE — 2500000005 HC RX 250 GENERAL PHARMACY W/O HCPCS

## 2024-09-10 PROCEDURE — 85027 COMPLETE CBC AUTOMATED: CPT | Performed by: FAMILY MEDICINE

## 2024-09-10 PROCEDURE — 97530 THERAPEUTIC ACTIVITIES: CPT | Mod: GO,CO

## 2024-09-10 PROCEDURE — 99239 HOSP IP/OBS DSCHRG MGMT >30: CPT | Performed by: FAMILY MEDICINE

## 2024-09-10 PROCEDURE — 94761 N-INVAS EAR/PLS OXIMETRY MLT: CPT

## 2024-09-10 PROCEDURE — 2500000001 HC RX 250 WO HCPCS SELF ADMINISTERED DRUGS (ALT 637 FOR MEDICARE OP)

## 2024-09-10 RX ORDER — IPRATROPIUM BROMIDE AND ALBUTEROL SULFATE 2.5; .5 MG/3ML; MG/3ML
3 SOLUTION RESPIRATORY (INHALATION)
Status: DISCONTINUED | OUTPATIENT
Start: 2024-09-10 | End: 2024-09-10

## 2024-09-10 RX ORDER — ALBUTEROL SULFATE 90 UG/1
2 INHALANT RESPIRATORY (INHALATION) EVERY 6 HOURS PRN
Qty: 18 G | Refills: 0 | Status: SHIPPED | OUTPATIENT
Start: 2024-09-10

## 2024-09-10 RX ORDER — ALBUTEROL SULFATE 90 UG/1
2 INHALANT RESPIRATORY (INHALATION) EVERY 6 HOURS PRN
Status: DISCONTINUED | OUTPATIENT
Start: 2024-09-10 | End: 2024-09-10 | Stop reason: HOSPADM

## 2024-09-10 RX ORDER — LEVOFLOXACIN 750 MG/1
750 TABLET ORAL DAILY
Qty: 2 TABLET | Refills: 0 | Status: SHIPPED | OUTPATIENT
Start: 2024-09-10 | End: 2024-09-12

## 2024-09-10 RX ORDER — DEXAMETHASONE 6 MG/1
6 TABLET ORAL DAILY
Qty: 7 TABLET | Refills: 0 | Status: SHIPPED | OUTPATIENT
Start: 2024-09-10 | End: 2024-09-17

## 2024-09-10 RX ORDER — GUAIFENESIN 600 MG/1
600 TABLET, EXTENDED RELEASE ORAL 2 TIMES DAILY
Qty: 60 TABLET | Refills: 0 | Status: SHIPPED | OUTPATIENT
Start: 2024-09-10 | End: 2024-10-10

## 2024-09-10 RX ADMIN — PANTOPRAZOLE SODIUM 40 MG: 40 TABLET, DELAYED RELEASE ORAL at 06:56

## 2024-09-10 RX ADMIN — Medication 3 MG: at 01:12

## 2024-09-10 RX ADMIN — HEPARIN SODIUM 5000 UNITS: 5000 INJECTION, SOLUTION INTRAVENOUS; SUBCUTANEOUS at 00:45

## 2024-09-10 RX ADMIN — MONTELUKAST 10 MG: 10 TABLET, FILM COATED ORAL at 09:53

## 2024-09-10 RX ADMIN — LEVOFLOXACIN 750 MG: 750 INJECTION, SOLUTION INTRAVENOUS at 14:25

## 2024-09-10 RX ADMIN — TIOTROPIUM BROMIDE INHALATION SPRAY 2 PUFF: 3.12 SPRAY, METERED RESPIRATORY (INHALATION) at 06:56

## 2024-09-10 RX ADMIN — Medication 2 L/MIN: at 09:58

## 2024-09-10 RX ADMIN — HEPARIN SODIUM 5000 UNITS: 5000 INJECTION, SOLUTION INTRAVENOUS; SUBCUTANEOUS at 14:26

## 2024-09-10 RX ADMIN — GUAIFENESIN 600 MG: 600 TABLET, EXTENDED RELEASE ORAL at 09:53

## 2024-09-10 RX ADMIN — HEPARIN SODIUM 5000 UNITS: 5000 INJECTION, SOLUTION INTRAVENOUS; SUBCUTANEOUS at 06:56

## 2024-09-10 ASSESSMENT — COGNITIVE AND FUNCTIONAL STATUS - GENERAL
PERSONAL GROOMING: A LITTLE
WALKING IN HOSPITAL ROOM: A LITTLE
WALKING IN HOSPITAL ROOM: A LITTLE
STANDING UP FROM CHAIR USING ARMS: A LITTLE
TOILETING: A LITTLE
DAILY ACTIVITIY SCORE: 22
CLIMB 3 TO 5 STEPS WITH RAILING: A LITTLE
PERSONAL GROOMING: A LITTLE
CLIMB 3 TO 5 STEPS WITH RAILING: A LITTLE
STANDING UP FROM CHAIR USING ARMS: A LITTLE
DRESSING REGULAR LOWER BODY CLOTHING: A LITTLE
TOILETING: A LITTLE
TOILETING: A LITTLE
PERSONAL GROOMING: A LITTLE
PERSONAL GROOMING: A LITTLE
HELP NEEDED FOR BATHING: A LITTLE
DAILY ACTIVITIY SCORE: 21
CLIMB 3 TO 5 STEPS WITH RAILING: A LITTLE
TOILETING: A LITTLE
TOILETING: A LITTLE
MOBILITY SCORE: 21
WALKING IN HOSPITAL ROOM: A LITTLE
STANDING UP FROM CHAIR USING ARMS: A LITTLE
STANDING UP FROM CHAIR USING ARMS: A LITTLE
WALKING IN HOSPITAL ROOM: A LITTLE
CLIMB 3 TO 5 STEPS WITH RAILING: A LITTLE

## 2024-09-10 ASSESSMENT — PAIN - FUNCTIONAL ASSESSMENT
PAIN_FUNCTIONAL_ASSESSMENT: 0-10
PAIN_FUNCTIONAL_ASSESSMENT: 0-10

## 2024-09-10 ASSESSMENT — PAIN SCALES - GENERAL
PAINLEVEL_OUTOF10: 0 - NO PAIN
PAINLEVEL_OUTOF10: 0 - NO PAIN

## 2024-09-10 NOTE — PROGRESS NOTES
Physical Therapy                 Therapy Communication Note    Patient Name: Megan Way  MRN: [id number]  Today's Date: 9/10/2024     Discipline: Physical Therapy    Missed Visit Reason:      Missed Time:     Comment: Spoke with PTA treating patient. Patient improving well, more appropriate for 4x/week. Continue with POC 4x/week until DC or goals met.

## 2024-09-10 NOTE — PROGRESS NOTES
Home O2 Evaluation: Completed    SpO2 on room air at rest:    88 %    SpO2 on room air with exertion:     %    SpO2 on oxygen at rest:    92 % on 1L NC    SpO2 on oxygen with exertion:  2L NC 80%,  3L NC 85%, 4L NC 89%    SpO2 on 4L/min O2 with exertion:  89 %    Ambulation distance:  121 ft    Recommended O2 device: Nasal Cannula    Recommended O2 L/min: Recommend 1L NC at rest and 4L NC with exertion    Recommended FiO2 %:

## 2024-09-10 NOTE — DISCHARGE SUMMARY
Discharge Diagnosis  Hypoxia    Issues Requiring Follow-Up  COVID-19, pneumonia, COPD    This discharge took greater than 35 minutes.    Test Results Pending At Discharge  Pending Labs       No current pending labs.            Hospital Course   74 y.o. female with PMHx s/f COPD (not on home oxygen), pulmonary emphysema, HTN, HLD, vitamin D deficiency presenting with shortness of breath. She states she was tested positive for COVID about a week ago and has been having increasing shortness of breath with exertion and productive cough (initially nonproductive).  She is unsure of what color the sputum is.  She has not taken any medications to alleviate her symptoms.  She takes Spiriva in the morning and Breo Ellipta at night for her COPD.  She has decreased appetite ever since her symptoms started. She denies fever and chills, nausea and vomiting, lightheadedness, headache, chest pain, abdominal pain, changes in urinary and bowel symptoms, myalgia.     ED Course (Summary):   Vitals on presentation: 98.5F, 72 bpm, 22 RR, 144/80, 94% on RA --> 74% with exertion  Labs: CMP-glucose 107, sodium 135  BNP 85, troponin negative  CBC-WBC 13.0, platelets 572, neutrophils 11.39  VBG-pH 7.40, pCO2 45, pO2 17, HCO3 27.9, lactate 2.1>>1.8  Imaging: CXR-Small pleural effusions with patchy consolidation in the left base   Interventions: NS 1 L, DuoNeb 3 mg, Decadron 10 mg, Rocephin and azithromycin        9/9:              Today the patient is found awake alert and interactive appropriately.  She states that she is not feeling very well at all but not worse than presentation.  Of note today she has been placed on 2 L nasal cannula.  Does not use oxygen at home.  Describes her initial positive COVID test last Wednesday.  Likely is beyond the window for remdesivir at this point.  Continues on dexamethasone.  Does not have much appetite but denies any nausea vomiting or diarrhea.  Not really producing any sputum.  Urine antigens negative.   Continues on azithromycin and Levaquin.  Likely will require home oxygen evaluation tomorrow as long as no increased oxygen requirement.    9/10:              Today the patient remains awake alert and interactive appropriately.  States she is feeling better than yesterday and is very anxious for discharge home.  She has remained stable on 2 L nasal cannula oxygenating well.  Home oxygen evaluation recommending 1 L at rest and 4 L with activity which will be arranged.  She will continue on Levaquin and dexamethasone to complete course of treatment.        Review of Systems   Constitutional:  Positive for appetite change. Negative for chills, fatigue and fever.   Respiratory:  Positive for improved cough and shortness of breath. Negative for chest tightness and wheezing.    Cardiovascular:  Negative for chest pain, palpitations and leg swelling.   Gastrointestinal:  Negative for abdominal pain, constipation, diarrhea, nausea and vomiting.   Genitourinary:  Negative for flank pain and hematuria.   Musculoskeletal:  Negative for back pain, joint swelling and myalgias.   Skin:  Negative for rash and wound.   Neurological:  Negative for tremors, syncope, weakness, light-headedness and headaches.        COVID pneumonia of left lower lobe with hypoxia  -Chest XR - Small pleural effusions with patchy consolidation in the left base   -Ordered Rocephin and azithromycin in ED, but given her penicillin allergy, will be continuing with levofloxacin  -COVID swab positive  -Strep, legionella urine antigen  -Sputum culture  -Continue home inhaler Spiriva, Breo Ellipta  -Mucinex BID  -Pulmonary hygiene   -RT c/s appreciated  9/9: Today appears to require 2 L nasal cannula.  No respiratory distress.  Likely outside the window for remdesivir.  Continues on dexamethasone as well as Levaquin and azithromycin.  Urinary antigens negative.  Does not appear to be producing any sputum.  9/10: Has remained very stable on 2 L nasal cannula.   Appears in no respiratory distress.  Desires discharge home.  Home oxygen evaluation recommending 1 L at rest and 4 L with activity.  She will complete course of Levaquin and dexamethasone.     COPD  -continue home inhalers as above     Mild hyponatremia  -Presumed secondary to poor oral intake  -Receive NS 1 L in ED, recheck in a.m.  9/10: Sodium appears stable and 1 35-1 36.     HTN  -elevated at 154/80 initially, stable currently  -not on meds  9/10: Blood pressures stable and reasonable today.     HLD  -not on meds  -in the process of getting lipid panel labs outpatient    Pertinent Physical Exam At Time of Discharge  Physical Exam  Constitutional: Pleasant and cooperative. Laying in bed in no acute distress. Conversant.  Speaking in full sentences.  Skin: Warm and dry; no obvious lesions, rashes, pallor, or jaundice. Good turgor.   Eyes: EOMI. Anicteric sclera.   ENT: Mucous membranes dry; no obvious injury or deformity appreciated.   Head and Neck: Normocephalic, atraumatic. ROM preserved.   Respiratory: Nonlabored on 2 L nasal cannula. Lungs with some dullness/decreased breath sounds left posterior bases but clear on the right.    Cardiovascular: RRR. No gross murmur, gallop, or rub. Extremities are warm and well-perfused with good capillary refill (< 3 seconds). No chest wall tenderness.   GI: Abdomen soft, nontender, nondistended. No obvious organomegaly appreciated. Bowel sounds are present and normoactive.  : No CVA tenderness.   MSK: No gross abnormalities appreciated. No limitations to AROM/PROM appreciated.   Extremities: No cyanosis, edema, or clubbing evident. Neurovascularly intact.   Neuro: A&Ox3. CN 2-12 grossly intact. Able to respond to questions appropriately and clearly. No acute focal neurologic deficits appreciated.  Psych: Appropriate mood and behavior.  Pleasant and cooperative to exam     Home Medications     Medication List      START taking these medications     albuterol 90  mcg/actuation inhaler; Inhale 2 puffs every 6 hours if   needed for wheezing.   dexAMETHasone 6 mg tablet; Commonly known as: Decadron; Take 1 tablet (6   mg) by mouth once daily for 7 days.   levoFLOXacin 750 mg tablet; Commonly known as: Levaquin; Take 1 tablet   (750 mg) by mouth once daily for 2 days.   Mucus Relief  mg 12 hr tablet; Generic drug: guaiFENesin; Take 1   tablet (600 mg) by mouth 2 times a day. Do not crush, chew, or split.     CONTINUE taking these medications     atorvastatin 10 mg tablet; Commonly known as: Lipitor   Breo Ellipta 100-25 mcg/dose inhaler; Generic drug: fluticasone   furoate-vilanteroL   ergocalciferol 1.25 MG (85840 UT) capsule; Commonly known as: Vitamin   D-2   montelukast 10 mg tablet; Commonly known as: Singulair   Spiriva with HandiHaler 18 mcg inhalation capsule; Generic drug:   tiotropium       Outpatient Follow-Up  PCP    Michael Jackson MD

## 2024-09-10 NOTE — CARE PLAN
The patient's goals for the shift include      The clinical goals for the shift include Patient will remain free from falls and injury throughout shift      Problem: Pain - Adult  Goal: Verbalizes/displays adequate comfort level or baseline comfort level  Outcome: Progressing     Problem: Safety - Adult  Goal: Free from fall injury  Outcome: Progressing     Problem: Discharge Planning  Goal: Discharge to home or other facility with appropriate resources  Outcome: Progressing     Problem: Chronic Conditions and Co-morbidities  Goal: Patient's chronic conditions and co-morbidity symptoms are monitored and maintained or improved  Outcome: Progressing     Problem: Respiratory  Goal: Minimal/no exertional discomfort or dyspnea this shift  Outcome: Progressing  Goal: No signs of respiratory distress (eg. Use of accessory muscles. Peds grunting)  Outcome: Progressing     Problem: Fall/Injury  Goal: Not fall by end of shift  Outcome: Progressing  Goal: Be free from injury by end of the shift  Outcome: Progressing  Goal: Verbalize understanding of personal risk factors for fall in the hospital  Outcome: Progressing  Goal: Verbalize understanding of risk factor reduction measures to prevent injury from fall in the home  Outcome: Progressing  Goal: Use assistive devices by end of the shift  Outcome: Progressing  Goal: Pace activities to prevent fatigue by end of the shift  Outcome: Progressing

## 2024-09-10 NOTE — PROGRESS NOTES
Occupational Therapy    OT Treatment    Patient Name: Megan Way  MRN: [id number]  Today's Date: 9/10/2024  Time Calculation  Start Time: 1116  Stop Time: 1136  Time Calculation (min): 20 min        Assessment:  End of Session Communication: Bedside nurse  End of Session Patient Position: Alarm off, not on at start of session, Up in chair     Plan:  Treatment Interventions: ADL retraining, Functional transfer training, UE strengthening/ROM, Endurance training  OT Frequency: 3 times per week  OT Discharge Recommendations: Low intensity level of continued care  Equipment Recommended upon Discharge: Wheeled walker  OT Recommended Transfer Status: Minimal assist  OT - OK to Discharge: Yes ((when medically approp.))  Treatment Interventions: ADL retraining, Functional transfer training, UE strengthening/ROM, Endurance training    Subjective   Previous Visit Info:  OT Last Visit  OT Received On: 09/10/24  General:  General  Prior to Session Communication: Bedside nurse  Patient Position Received: Up in chair, Alarm off, not on at start of session  General Comment: pt agreeable to OT tx session  Precautions:  Medical Precautions: Fall precautions, Oxygen therapy device and L/min    Vital Signs (Past 2hrs)                 Pain:  Pain Assessment  Pain Assessment: 0-10  0-10 (Numeric) Pain Score: 0 - No pain    Objective    Cognition:  Cognition  Overall Cognitive Status: Within Functional Limits    Functional Standing Tolerance:  Time: 1-2 min standing bouts  Activity: pt tolerated standing bouts with close supervision and FWW, no LOB noted  Bed Mobility/Transfers: Transfers  Transfer: Yes  Transfer 1  Transfer From 1: Chair with arms to  Transfer to 1: Stand, Sit  Technique 1: Sit to stand, Stand to sit  Transfer Device 1: Walker  Transfer Level of Assistance 1: Close supervision  Transfers 2  Transfer From 2: Bed to  Transfer to 2: Stand  Technique 2: Sit to stand, Stand to sit  Transfer Device 2: Walker  Transfer Level of  Assistance 2: Close supervision      Functional Mobility:  Functional Mobility  Functional Mobility Performed: Yes  Functional Mobility 1  Comments 1: pt tolerated standing bout from armed chair with FWW and close supervision and ambulated to other side of bed. O2 monitored at beginning of ambulation, pt trending at 92% SpO2 and monitored after mobility pt was trending at 85%, requiring cues for PLB and <1 min for recovering back to 92% SpO2. x2 mobility bouts  Sitting Balance:  Static Sitting Balance  Static Sitting-Balance Support: Feet supported, Bilateral upper extremity supported  Static Sitting-Level of Assistance: Close supervision  Dynamic Sitting Balance  Dynamic Sitting-Balance Support: Bilateral upper extremity supported, Feet supported  Dynamic Sitting-Level of Assistance: Close supervision  Standing Balance:  Static Standing Balance  Static Standing-Level of Assistance: Close supervision  Dynamic Standing Balance  Dynamic Standing-Level of Assistance: Close supervision      Outcome Measures:[facility] Daily Activity  Putting on and taking off regular lower body clothing: A little  Bathing (including washing, rinsing, drying): A little  Putting on and taking off regular upper body clothing: None  Toileting, which includes using toilet, bedpan or urinal: A little  Taking care of personal grooming such as brushing teeth: None  Eating Meals: None  Daily Activity - Total Score: 21        Education Documentation  ADL Training, taught by YOANNA Jones at 9/10/2024 12:11 PM.  Learner: Patient  Readiness: Acceptance  Method: Explanation  Response: Verbalizes Understanding, Needs Reinforcement    Education Comments  No comments found.        OP EDUCATION:       Goals:  Encounter Problems       Encounter Problems (Active)       EXERCISE/STRENGTHENING       Joaquin. UE ex. program with good breathing tech.  (Progressing)       Start:  09/09/24    Expected End:  09/16/24               MOBILITY       Demo. safe,  supervised func. mobility with FWW with O2 PRN .  (Progressing)       Start:  09/09/24    Expected End:  09/16/24

## 2024-09-10 NOTE — PROGRESS NOTES
Physical Therapy                 Therapy Communication Note    Patient Name: Megan Way  MRN: [id number]  Today's Date: 9/10/2024     Discipline: Physical Therapy    Missed Visit Reason: Missed Visit Reason: Parent refused, Other (Comment) (patient just returned to bed per aide patient up all morning in chir back and  forth to bathroom and worked with OT. patient states she is too tired now and needs a couple of hours to rest, bt will get up around dinner time)    Missed Time: Attempt    Comment:

## 2024-09-10 NOTE — NURSING NOTE
Partial shift, agree with previous nurses head to toe assessment. Patient was pain free and stable. Melatonin was given due to patient having difficulty falling asleep.

## 2024-09-10 NOTE — CARE PLAN
The patient's goals for the shift include  Patient will remain comfortable and safe    The clinical goals for the shift include patient will remain free from falls

## 2024-09-15 LAB
ATRIAL RATE: 95 BPM
P AXIS: 87 DEGREES
PR INTERVAL: 142 MS
Q ONSET: 252 MS
QRS COUNT: 15 BEATS
QRS DURATION: 78 MS
QT INTERVAL: 340 MS
QTC CALCULATION(BAZETT): 428 MS
QTC FREDERICIA: 396 MS
R AXIS: 48 DEGREES
T AXIS: 68 DEGREES
T OFFSET: 422 MS
VENTRICULAR RATE: 95 BPM

## 2024-09-19 DIAGNOSIS — J43.9 PULMONARY EMPHYSEMA, UNSPECIFIED EMPHYSEMA TYPE (HCC): ICD-10-CM

## 2024-09-19 DIAGNOSIS — E78.5 HYPERLIPIDEMIA, UNSPECIFIED HYPERLIPIDEMIA TYPE: ICD-10-CM

## 2024-09-19 RX ORDER — FLUTICASONE FUROATE AND VILANTEROL TRIFENATATE 100; 25 UG/1; UG/1
1 POWDER RESPIRATORY (INHALATION) DAILY
COMMUNITY
Start: 2024-08-15 | End: 2024-09-19 | Stop reason: SDUPTHER

## 2024-09-20 RX ORDER — MONTELUKAST SODIUM 10 MG/1
TABLET ORAL
Qty: 30 TABLET | Refills: 5 | Status: SHIPPED | OUTPATIENT
Start: 2024-09-20

## 2024-09-20 RX ORDER — ATORVASTATIN CALCIUM 10 MG/1
TABLET, FILM COATED ORAL
Qty: 90 TABLET | Refills: 1 | Status: SHIPPED | OUTPATIENT
Start: 2024-09-20

## 2024-09-20 RX ORDER — FLUTICASONE FUROATE AND VILANTEROL TRIFENATATE 100; 25 UG/1; UG/1
1 POWDER RESPIRATORY (INHALATION) DAILY
Qty: 1 EACH | Refills: 5 | Status: SHIPPED | OUTPATIENT
Start: 2024-09-20

## 2024-12-19 ENCOUNTER — OFFICE VISIT (OUTPATIENT)
Dept: PRIMARY CARE CLINIC | Age: 74
End: 2024-12-19

## 2024-12-19 VITALS
BODY MASS INDEX: 16.62 KG/M2 | OXYGEN SATURATION: 98 % | HEART RATE: 100 BPM | RESPIRATION RATE: 18 BRPM | HEIGHT: 61 IN | WEIGHT: 88 LBS | TEMPERATURE: 97.4 F | DIASTOLIC BLOOD PRESSURE: 88 MMHG | SYSTOLIC BLOOD PRESSURE: 138 MMHG

## 2024-12-19 DIAGNOSIS — J44.1 COPD WITH ACUTE EXACERBATION (HCC): Primary | ICD-10-CM

## 2024-12-19 DIAGNOSIS — J40 BRONCHITIS: ICD-10-CM

## 2024-12-19 LAB
INFLUENZA A ANTIGEN, POC: NEGATIVE
INFLUENZA B ANTIGEN, POC: NEGATIVE
LOT EXPIRE DATE: NORMAL
LOT KIT NUMBER: NORMAL
SARS-COV-2, POC: NORMAL
VALID INTERNAL CONTROL: NORMAL
VENDOR AND KIT NAME POC: NORMAL

## 2024-12-19 RX ORDER — AZITHROMYCIN 250 MG/1
TABLET, FILM COATED ORAL
Qty: 6 TABLET | Refills: 0 | Status: SHIPPED | OUTPATIENT
Start: 2024-12-19 | End: 2024-12-29

## 2024-12-19 RX ORDER — PREDNISONE 20 MG/1
20 TABLET ORAL 2 TIMES DAILY
Qty: 10 TABLET | Refills: 0 | Status: SHIPPED | OUTPATIENT
Start: 2024-12-19 | End: 2024-12-24

## 2024-12-19 NOTE — PROGRESS NOTES
Warm, dry, without visible rash.  Neurological:  Alert and oriented.  Motor functions intact.  Responds to verbal commands.     Lab / Imaging Results   (All laboratory and radiology results have been personally reviewed by myself)  Labs:  Results for orders placed or performed in visit on 24   POCT COVID-19 & Influenza A/B   Result Value Ref Range    SARS-COV-2, POC Not-Detected Not Detected    Influenza A Antigen, POC Negative Not Detected    Influenza B Antigen, POC Negative Not Detected    Vendor and kit name Veritor     Internal Control pass     Lot/Kit Number [id number]     Lot/Kit  date: 26          Assessment / Plan     Impression(s):  Kelley \"Priti\" was seen today for post-covid symptoms.    Diagnoses and all orders for this visit:    COPD with acute exacerbation (HCC)  -     azithromycin (ZITHROMAX) 250 MG tablet; 500mg on day 1 followed by 250mg on days 2 - 5  -     predniSONE (DELTASONE) 20 MG tablet; Take 1 tablet by mouth 2 times daily for 5 days    Bronchitis  -     POCT COVID-19 & Influenza A/B  -     azithromycin (ZITHROMAX) 250 MG tablet; 500mg on day 1 followed by 250mg on days 2 - 5  -     predniSONE (DELTASONE) 20 MG tablet; Take 1 tablet by mouth 2 times daily for 5 days      Disposition:  Disposition: Discharge to home.    -Rapid COVID-19 came back negative. Rapid influenza came back negative.    -Recommended XR chest, and patient politely declines at this time.    -Reviewed patient's allergies with her. Script written for azithromycin and prednisone, side effects discussed. Advised continued use of inhalers at home. Offered prescription cough suppressants, and patient politely declines at this time. Increase fluids and rest. Symptomatic relief discussed. F/u PCP in 5-7 days if symptoms persist. ED sooner if symptoms worsen or change. Red flag symptoms discussed. Patient is in agreement with this care plan. All questions answered.    Mely Winkler PA-C    **This report was

## 2025-01-21 ENCOUNTER — OFFICE VISIT (OUTPATIENT)
Dept: PRIMARY CARE CLINIC | Age: 75
End: 2025-01-21

## 2025-01-21 VITALS
SYSTOLIC BLOOD PRESSURE: 138 MMHG | RESPIRATION RATE: 18 BRPM | WEIGHT: 87 LBS | DIASTOLIC BLOOD PRESSURE: 88 MMHG | OXYGEN SATURATION: 96 % | BODY MASS INDEX: 16.42 KG/M2 | TEMPERATURE: 98 F | HEART RATE: 100 BPM | HEIGHT: 61 IN

## 2025-01-21 VITALS
WEIGHT: 87 LBS | DIASTOLIC BLOOD PRESSURE: 88 MMHG | BODY MASS INDEX: 16.42 KG/M2 | RESPIRATION RATE: 18 BRPM | OXYGEN SATURATION: 92 % | HEART RATE: 100 BPM | SYSTOLIC BLOOD PRESSURE: 138 MMHG | HEIGHT: 61 IN | TEMPERATURE: 98 F

## 2025-01-21 DIAGNOSIS — J43.9 PULMONARY EMPHYSEMA, UNSPECIFIED EMPHYSEMA TYPE (HCC): ICD-10-CM

## 2025-01-21 DIAGNOSIS — I10 ESSENTIAL (PRIMARY) HYPERTENSION: ICD-10-CM

## 2025-01-21 DIAGNOSIS — E55.9 VITAMIN D DEFICIENCY: ICD-10-CM

## 2025-01-21 DIAGNOSIS — E78.5 HYPERLIPIDEMIA, UNSPECIFIED HYPERLIPIDEMIA TYPE: ICD-10-CM

## 2025-01-21 DIAGNOSIS — Z00.00 MEDICARE ANNUAL WELLNESS VISIT, SUBSEQUENT: Primary | ICD-10-CM

## 2025-01-21 DIAGNOSIS — J44.9 CHRONIC OBSTRUCTIVE PULMONARY DISEASE, UNSPECIFIED COPD TYPE (HCC): ICD-10-CM

## 2025-01-21 DIAGNOSIS — J44.9 CHRONIC OBSTRUCTIVE PULMONARY DISEASE, UNSPECIFIED COPD TYPE (HCC): Primary | ICD-10-CM

## 2025-01-21 DIAGNOSIS — Z87.891 PERSONAL HISTORY OF TOBACCO USE: ICD-10-CM

## 2025-01-21 LAB
BASOPHILS ABSOLUTE: 0.04 K/UL (ref 0–0.2)
BASOPHILS RELATIVE PERCENT: 1 % (ref 0–2)
EOSINOPHILS ABSOLUTE: 0.05 K/UL (ref 0.05–0.5)
EOSINOPHILS RELATIVE PERCENT: 1 % (ref 0–6)
HCT VFR BLD CALC: 48.8 % (ref 34–48)
HEMOGLOBIN: 15.9 G/DL (ref 11.5–15.5)
IMMATURE GRANULOCYTES %: 1 % (ref 0–5)
IMMATURE GRANULOCYTES ABSOLUTE: 0.03 K/UL (ref 0–0.58)
LYMPHOCYTES ABSOLUTE: 1.17 K/UL (ref 1.5–4)
LYMPHOCYTES RELATIVE PERCENT: 20 % (ref 20–42)
MCH RBC QN AUTO: 30.9 PG (ref 26–35)
MCHC RBC AUTO-ENTMCNC: 32.6 G/DL (ref 32–34.5)
MCV RBC AUTO: 94.9 FL (ref 80–99.9)
MONOCYTES ABSOLUTE: 0.43 K/UL (ref 0.1–0.95)
MONOCYTES RELATIVE PERCENT: 7 % (ref 2–12)
NEUTROPHILS ABSOLUTE: 4.2 K/UL (ref 1.8–7.3)
NEUTROPHILS RELATIVE PERCENT: 71 % (ref 43–80)
PDW BLD-RTO: 13.8 % (ref 11.5–15)
PLATELET CONFIRMATION: NORMAL
PLATELET, FLUORESCENCE: 234 K/UL (ref 130–450)
PMV BLD AUTO: 11.9 FL (ref 7–12)
RBC # BLD: 5.14 M/UL (ref 3.5–5.5)
WBC # BLD: 5.9 K/UL (ref 4.5–11.5)

## 2025-01-21 RX ORDER — TIOTROPIUM BROMIDE 18 UG/1
18 CAPSULE ORAL; RESPIRATORY (INHALATION) DAILY
Qty: 30 CAPSULE | Refills: 5 | Status: SHIPPED | OUTPATIENT
Start: 2025-01-21

## 2025-01-21 RX ORDER — FLUTICASONE FUROATE AND VILANTEROL TRIFENATATE 100; 25 UG/1; UG/1
1 POWDER RESPIRATORY (INHALATION) DAILY
Qty: 1 EACH | Refills: 5 | Status: SHIPPED | OUTPATIENT
Start: 2025-01-21

## 2025-01-21 SDOH — ECONOMIC STABILITY: FOOD INSECURITY: WITHIN THE PAST 12 MONTHS, YOU WORRIED THAT YOUR FOOD WOULD RUN OUT BEFORE YOU GOT MONEY TO BUY MORE.: NEVER TRUE

## 2025-01-21 SDOH — ECONOMIC STABILITY: FOOD INSECURITY: WITHIN THE PAST 12 MONTHS, THE FOOD YOU BOUGHT JUST DIDN'T LAST AND YOU DIDN'T HAVE MONEY TO GET MORE.: NEVER TRUE

## 2025-01-21 ASSESSMENT — ENCOUNTER SYMPTOMS
NAUSEA: 0
RHINORRHEA: 0
CHOKING: 0
SHORTNESS OF BREATH: 1
BACK PAIN: 0
CONSTIPATION: 0
EYE REDNESS: 0
SINUS PRESSURE: 0
TROUBLE SWALLOWING: 0
CHEST TIGHTNESS: 0
EYE DISCHARGE: 0
COUGH: 0
EYE ITCHING: 0
SORE THROAT: 0
WHEEZING: 0
ABDOMINAL PAIN: 0
DIARRHEA: 0
BLOOD IN STOOL: 0
COLOR CHANGE: 0
VOMITING: 0
SINUS PAIN: 0
VOICE CHANGE: 0
FACIAL SWELLING: 0
ABDOMINAL DISTENTION: 0
PHOTOPHOBIA: 0
EYE PAIN: 0

## 2025-01-21 ASSESSMENT — PATIENT HEALTH QUESTIONNAIRE - PHQ9
SUM OF ALL RESPONSES TO PHQ QUESTIONS 1-9: 0
1. LITTLE INTEREST OR PLEASURE IN DOING THINGS: NOT AT ALL
SUM OF ALL RESPONSES TO PHQ9 QUESTIONS 1 & 2: 0
SUM OF ALL RESPONSES TO PHQ QUESTIONS 1-9: 0
SUM OF ALL RESPONSES TO PHQ QUESTIONS 1-9: 0
1. LITTLE INTEREST OR PLEASURE IN DOING THINGS: NOT AT ALL
2. FEELING DOWN, DEPRESSED OR HOPELESS: NOT AT ALL
SUM OF ALL RESPONSES TO PHQ QUESTIONS 1-9: 0
SUM OF ALL RESPONSES TO PHQ QUESTIONS 1-9: 0
2. FEELING DOWN, DEPRESSED OR HOPELESS: NOT AT ALL
SUM OF ALL RESPONSES TO PHQ QUESTIONS 1-9: 0
SUM OF ALL RESPONSES TO PHQ9 QUESTIONS 1 & 2: 0
SUM OF ALL RESPONSES TO PHQ QUESTIONS 1-9: 0
SUM OF ALL RESPONSES TO PHQ QUESTIONS 1-9: 0

## 2025-01-21 ASSESSMENT — LIFESTYLE VARIABLES
HOW OFTEN DO YOU HAVE A DRINK CONTAINING ALCOHOL: NEVER
HOW MANY STANDARD DRINKS CONTAINING ALCOHOL DO YOU HAVE ON A TYPICAL DAY: PATIENT DOES NOT DRINK

## 2025-01-21 NOTE — PROGRESS NOTES
Medicare Annual Wellness Visit    Kelley Platt is here for Medicare AWV    Assessment & Plan   Medicare annual wellness visit, subsequent  Personal history of tobacco use  -     [place] VISIT TO DISCUSS LUNG CA SCREEN W LDCT  Chronic obstructive pulmonary disease, unspecified COPD type (HCC)  Pulmonary emphysema, unspecified emphysema type (HCC)  Hyperlipidemia, unspecified hyperlipidemia type  Essential (primary) hypertension  Vitamin D deficiency       Return for Medicare Annual Wellness Visit in 1 year.     Subjective     Patient's complete Health Risk Assessment and screening values have been reviewed and are found in Flowsheets. The following problems were reviewed today and where indicated follow up appointments were made and/or referrals ordered.    Positive Risk Factor Screenings with Interventions:              Inactivity:  On average, how many days per week do you engage in moderate to strenuous exercise (like a brisk walk)?: 0 days (!) Abnormal  On average, how many minutes do you engage in exercise at this level?: 0 min  Interventions:  Recommendations: patient agrees to exercise for at least 150 minutes/week     Abnormal BMI (underweight):  Body mass index is 16.45 kg/m². (!) Abnormal  Interventions:  Patient comments: She reports that she lost weight due to having COVID and pneumonia.           Lung Cancer Screening:  Guidelines regarding LDCT screening for lung cancer reviewed. Patient declined screening.            Objective   Vitals:    01/21/25 1047   BP: 138/88   Pulse: 100   Resp: 18   Temp: 98 °F (36.7 °C)   SpO2: 92%   Weight: 39.5 kg (87 lb)   Height: 1.549 m (5' 0.98\")      Body mass index is 16.45 kg/m².                Allergies   Allergen Reactions    Penicillins Hives     Prior to Visit Medications    Medication Sig Taking? Authorizing Provider   tiotropium (SPIRIVA HANDIHALER) 18 MCG inhalation capsule Inhale 1 capsule into the lungs daily  Raj Hernandes, RASHMI - HONORIO NIX

## 2025-01-21 NOTE — PATIENT INSTRUCTIONS
Learning About Being Active as an Older Adult  Why is being active important as you get older?     Being active is one of the best things you can do for your health. And it's never too late to start. Being active--or getting active, if you aren't already--has definite benefits. It can:  Give you more energy,  Keep your mind sharp.  Improve balance to reduce your risk of falls.  Help you manage chronic illness with fewer medicines.  No matter how old you are, how fit you are, or what health problems you have, there is a form of activity that will work for you. And the more physical activity you can do, the better your overall health will be.  What kinds of activity can help you stay healthy?  Being more active will make your daily activities easier. Physical activity includes planned exercise and things you do in daily life. There are four types of activity:  Aerobic.  Doing aerobic activity makes your heart and lungs strong.  Includes walking, dancing, and gardening.  Aim for at least 2½ hours spread throughout the week.  It improves your energy and can help you sleep better.  Muscle-strengthening.  This type of activity can help maintain muscle and strengthen bones.  Includes climbing stairs, using resistance bands, and lifting or carrying heavy loads.  Aim for at least twice a week.  It can help protect the knees and other joints.  Stretching.  Stretching gives you better range of motion in joints and muscles.  Includes upper arm stretches, calf stretches, and gentle yoga.  Aim for at least twice a week, preferably after your muscles are warmed up from other activities.  It can help you function better in daily life.  Balancing.  This helps you stay coordinated and have good posture.  Includes heel-to-toe walking, lee ann chi, and certain types of yoga.  Aim for at least 3 days a week.  It can reduce your risk of falling.  Even if you have a hard time meeting the recommendations, it's better to be more active

## 2025-01-21 NOTE — ASSESSMENT & PLAN NOTE
Chronic, at goal (stable), continue current plan pending work up below    Orders:    Lipid Panel

## 2025-01-21 NOTE — ASSESSMENT & PLAN NOTE
Chronic, at goal (stable), continue current plan pending work up below and medication adherence emphasized    Orders:    Vitamin D 25 Hydroxy

## 2025-01-21 NOTE — ASSESSMENT & PLAN NOTE
Chronic, at goal (stable), continue current treatment plan    Orders:    tiotropium (SPIRIVA HANDIHALER) 18 MCG inhalation capsule; Inhale 1 capsule into the lungs daily    BREO ELLIPTA 100-25 MCG/ACT inhaler; Inhale 1 puff into the lungs daily    Comprehensive Metabolic Panel    CBC with Auto Differential

## 2025-01-21 NOTE — ASSESSMENT & PLAN NOTE
Chronic, at goal (stable), continue current treatment plan    Orders:    tiotropium (SPIRIVA HANDIHALER) 18 MCG inhalation capsule; Inhale 1 capsule into the lungs daily    TSH

## 2025-01-21 NOTE — PROGRESS NOTES
25  Kelley Platt : 1950 Sex: female  Age: 74 y.o.    Chief Complaint   Patient presents with    COPD       Kelley is seen today for a follow-up and a review of her overall condition and plan of care.  She also brought in a paper, from her insurance company, stating that she needs a prescription for Tiotropium bromide, instead of Spiriva, as they will cover the generic and not the name brand.  She is also requesting a refill on her Breo Ellipta.  Over the past month or so she was diagnosed with COVID and pneumonia.  She feels that this has weakened her breathing and she has now been using oxygen at night and throughout the day, as needed.  She tries not to use it throughout the day.  She has also been having some increased depression, which she feels is related to her being on oxygen.  She states that she had went a long time without being on oxygen and is sad now that she has to be on it.  Lastly, she reports \"I do not sleep much\".  She states that this has been a longstanding issue for her and has been no worse than normal.  She denies any further acute complaints.  She denies any acute confusion, forgetfulness or any change in cognition.        Review of Systems   Constitutional:  Negative for appetite change, chills, diaphoresis, fatigue, fever and unexpected weight change.   HENT:  Negative for congestion, ear pain, facial swelling, hearing loss, nosebleeds, postnasal drip, rhinorrhea, sinus pressure, sinus pain, sneezing, sore throat, tinnitus, trouble swallowing and voice change.    Eyes:  Negative for photophobia, pain, discharge, redness and itching.   Respiratory:  Positive for shortness of breath (\"My breathing is not as good as it was, before the COVID and pneumonia\"). Negative for cough, choking, chest tightness and wheezing.         She has been using oxygen at night and only used during the day, as needed   Cardiovascular:  Negative for chest pain, palpitations and leg swelling.

## 2025-01-22 LAB
ALBUMIN: 4.4 G/DL (ref 3.5–5.2)
ALP BLD-CCNC: 79 U/L (ref 35–104)
ALT SERPL-CCNC: 13 U/L (ref 0–32)
ANION GAP SERPL CALCULATED.3IONS-SCNC: 13 MMOL/L (ref 7–16)
AST SERPL-CCNC: 22 U/L (ref 0–31)
BILIRUB SERPL-MCNC: 0.3 MG/DL (ref 0–1.2)
BUN BLDV-MCNC: 8 MG/DL (ref 6–23)
CALCIUM SERPL-MCNC: 10.2 MG/DL (ref 8.6–10.2)
CHLORIDE BLD-SCNC: 102 MMOL/L (ref 98–107)
CHOLESTEROL, TOTAL: 186 MG/DL
CO2: 25 MMOL/L (ref 22–29)
CREAT SERPL-MCNC: 0.7 MG/DL (ref 0.5–1)
GFR, ESTIMATED: >90 ML/MIN/1.73M2
GLUCOSE BLD-MCNC: 99 MG/DL (ref 74–99)
HDLC SERPL-MCNC: 82 MG/DL
LDL CHOLESTEROL: 91 MG/DL
POTASSIUM SERPL-SCNC: 4.9 MMOL/L (ref 3.5–5)
SODIUM BLD-SCNC: 140 MMOL/L (ref 132–146)
TOTAL PROTEIN: 7.3 G/DL (ref 6.4–8.3)
TRIGL SERPL-MCNC: 64 MG/DL
TSH SERPL DL<=0.05 MIU/L-ACNC: 1.63 UIU/ML (ref 0.27–4.2)
VITAMIN D 25-HYDROXY: 59 NG/ML (ref 30–100)
VLDLC SERPL CALC-MCNC: 13 MG/DL

## 2025-04-16 DIAGNOSIS — J43.9 PULMONARY EMPHYSEMA, UNSPECIFIED EMPHYSEMA TYPE (HCC): ICD-10-CM

## 2025-04-16 RX ORDER — MONTELUKAST SODIUM 10 MG/1
TABLET ORAL
Qty: 30 TABLET | Refills: 5 | Status: SHIPPED | OUTPATIENT
Start: 2025-04-16

## 2025-04-16 NOTE — TELEPHONE ENCOUNTER
Name of Medication(s) Requested:  Requested Prescriptions     Pending Prescriptions Disp Refills    montelukast (SINGULAIR) 10 MG tablet 30 tablet 5     Sig: take 1 tablet by mouth once daily       Medication is on current medication list Yes    Dosage and directions were verified? Yes    Quantity verified: 30 day supply     Pharmacy Verified?  Yes    Last Appointment:  1/21/2025    Future appts:  Future Appointments   Date Time Provider Department Center   4/23/2025 10:00 AM Raj Hernandes APRN - NP [facility]   1/23/2026 10:00 AM Raj Hernandes APRN - NP [facility]        (If no appt send self scheduling link. .REFILLAPPT)  Scheduling request sent?     [] Yes  [x] No    Does patient need updated?  [] Yes  [x] No

## 2025-04-23 ENCOUNTER — OFFICE VISIT (OUTPATIENT)
Dept: PRIMARY CARE CLINIC | Age: 75
End: 2025-04-23
Payer: MEDICARE

## 2025-04-23 VITALS
TEMPERATURE: 98.3 F | OXYGEN SATURATION: 96 % | DIASTOLIC BLOOD PRESSURE: 78 MMHG | HEART RATE: 91 BPM | WEIGHT: 84.6 LBS | HEIGHT: 61 IN | SYSTOLIC BLOOD PRESSURE: 124 MMHG | BODY MASS INDEX: 15.97 KG/M2

## 2025-04-23 DIAGNOSIS — E55.9 VITAMIN D DEFICIENCY: ICD-10-CM

## 2025-04-23 DIAGNOSIS — E78.5 HYPERLIPIDEMIA, UNSPECIFIED HYPERLIPIDEMIA TYPE: ICD-10-CM

## 2025-04-23 DIAGNOSIS — J44.9 CHRONIC OBSTRUCTIVE PULMONARY DISEASE, UNSPECIFIED COPD TYPE (HCC): Primary | ICD-10-CM

## 2025-04-23 DIAGNOSIS — J43.9 PULMONARY EMPHYSEMA, UNSPECIFIED EMPHYSEMA TYPE (HCC): ICD-10-CM

## 2025-04-23 DIAGNOSIS — I10 ESSENTIAL (PRIMARY) HYPERTENSION: ICD-10-CM

## 2025-04-23 PROCEDURE — 3074F SYST BP LT 130 MM HG: CPT | Performed by: NURSE PRACTITIONER

## 2025-04-23 PROCEDURE — 1159F MED LIST DOCD IN RCRD: CPT | Performed by: NURSE PRACTITIONER

## 2025-04-23 PROCEDURE — 99214 OFFICE O/P EST MOD 30 MIN: CPT | Performed by: NURSE PRACTITIONER

## 2025-04-23 PROCEDURE — 1123F ACP DISCUSS/DSCN MKR DOCD: CPT | Performed by: NURSE PRACTITIONER

## 2025-04-23 PROCEDURE — 1160F RVW MEDS BY RX/DR IN RCRD: CPT | Performed by: NURSE PRACTITIONER

## 2025-04-23 PROCEDURE — 3078F DIAST BP <80 MM HG: CPT | Performed by: NURSE PRACTITIONER

## 2025-04-23 RX ORDER — TIOTROPIUM BROMIDE 18 UG/1
18 CAPSULE ORAL; RESPIRATORY (INHALATION) DAILY
Qty: 30 CAPSULE | Refills: 5 | Status: SHIPPED | OUTPATIENT
Start: 2025-04-23

## 2025-04-23 RX ORDER — MONTELUKAST SODIUM 10 MG/1
TABLET ORAL
Qty: 30 TABLET | Refills: 5 | Status: SHIPPED | OUTPATIENT
Start: 2025-04-23

## 2025-04-23 RX ORDER — ATORVASTATIN CALCIUM 10 MG/1
TABLET, FILM COATED ORAL
Qty: 90 TABLET | Refills: 1 | Status: SHIPPED | OUTPATIENT
Start: 2025-04-23

## 2025-04-23 RX ORDER — FLUTICASONE FUROATE AND VILANTEROL TRIFENATATE 100; 25 UG/1; UG/1
1 POWDER RESPIRATORY (INHALATION) DAILY
Qty: 1 EACH | Refills: 5 | Status: SHIPPED | OUTPATIENT
Start: 2025-04-23

## 2025-04-23 ASSESSMENT — ENCOUNTER SYMPTOMS
EYE ITCHING: 0
PHOTOPHOBIA: 0
CHOKING: 0
ABDOMINAL DISTENTION: 0
RHINORRHEA: 0
COUGH: 0
EYE DISCHARGE: 0
NAUSEA: 0
EYE REDNESS: 0
CHEST TIGHTNESS: 0
SINUS PAIN: 0
VOICE CHANGE: 0
ABDOMINAL PAIN: 0
VOMITING: 0
BLOOD IN STOOL: 0
EYE PAIN: 0
BACK PAIN: 0
TROUBLE SWALLOWING: 0
CONSTIPATION: 0
SINUS PRESSURE: 0
SHORTNESS OF BREATH: 1
COLOR CHANGE: 0
DIARRHEA: 0
FACIAL SWELLING: 0
SORE THROAT: 0
WHEEZING: 0

## 2025-04-23 NOTE — ASSESSMENT & PLAN NOTE
Chronic, at goal (stable), continue current treatment plan, medication adherence emphasized, and lifestyle modifications recommended    Orders:    atorvastatin (LIPITOR) 10 MG tablet; take 1 tablet by mouth once daily

## 2025-04-23 NOTE — ASSESSMENT & PLAN NOTE
Orders:    tiotropium (SPIRIVA HANDIHALER) 18 MCG inhalation capsule; Inhale 1 capsule into the lungs daily    montelukast (SINGULAIR) 10 MG tablet; take 1 tablet by mouth once daily

## 2025-04-23 NOTE — ASSESSMENT & PLAN NOTE
Chronic, at goal (stable), continue current treatment plan and medication adherence emphasized    Orders:    tiotropium (SPIRIVA HANDIHALER) 18 MCG inhalation capsule; Inhale 1 capsule into the lungs daily

## 2025-04-23 NOTE — ASSESSMENT & PLAN NOTE
Chronic, at goal (stable), continue current treatment plan and medication adherence emphasized    Orders:    tiotropium (SPIRIVA HANDIHALER) 18 MCG inhalation capsule; Inhale 1 capsule into the lungs daily    BREO ELLIPTA 100-25 MCG/ACT inhaler; Inhale 1 puff into the lungs daily

## 2025-04-23 NOTE — PROGRESS NOTES
25  Kelley Platt : 1950 Sex: female  Age: 75 y.o.    Chief Complaint   Patient presents with    3 Month Follow-Up     No concerns at this time        Kelley is a 75 y.o. female seen today for a follow-up and a review of her overall condition and plan of care.  [birth year].  She is also here to discuss lab work, that she has had recently drawn.  Her blood work looks very good with no significant abnormalities.  She has lost 3 pounds, since her last visit.  When I asked her about her appetite she reports \"it has not been great\".  I did mention possibility of a medication to help stimulate her appetite, but she does not want to go forth with that, yet.  She states that she would try to manage this on her own.  If her weight is unchanged or lower next time, we would then discuss that medication.  She states that her shortness of breath is \"no worse than normal\".  She feels that her maintenance inhalers are doing well for her.  She has only been using her albuterol inhaler, in the evenings.  She does report some increased fatigue and sadness, because she has not bounced back as well as she had wanted to, from COVID and pneumonia.  She denies any further acute complaints at this time.  She denies any acute confusion, forgetfulness or any change in cognition.        Review of Systems   Constitutional:  Positive for appetite change (\"It has not been great\") and fatigue (\"I don't have the stamina, that I used to\"  This has been since having COVID and pneumonia). Negative for chills, diaphoresis, fever and unexpected weight change.   HENT:  Negative for congestion, ear pain, facial swelling, hearing loss, nosebleeds, postnasal drip, rhinorrhea, sinus pressure, sinus pain, sneezing, sore throat, tinnitus, trouble swallowing and voice change.    Eyes:  Negative for photophobia, pain, discharge, redness and itching.   Respiratory:  Positive for shortness of breath (\"No worse than normal\"). Negative for

## 2025-07-23 ENCOUNTER — OFFICE VISIT (OUTPATIENT)
Dept: PRIMARY CARE CLINIC | Age: 75
End: 2025-07-23

## 2025-07-23 VITALS
BODY MASS INDEX: 15.84 KG/M2 | SYSTOLIC BLOOD PRESSURE: 124 MMHG | HEART RATE: 94 BPM | WEIGHT: 83.8 LBS | TEMPERATURE: 97.6 F | DIASTOLIC BLOOD PRESSURE: 68 MMHG | OXYGEN SATURATION: 95 %

## 2025-07-23 DIAGNOSIS — E78.5 HYPERLIPIDEMIA, UNSPECIFIED HYPERLIPIDEMIA TYPE: ICD-10-CM

## 2025-07-23 DIAGNOSIS — E55.9 VITAMIN D DEFICIENCY: ICD-10-CM

## 2025-07-23 DIAGNOSIS — J44.9 CHRONIC OBSTRUCTIVE PULMONARY DISEASE, UNSPECIFIED COPD TYPE (HCC): Primary | ICD-10-CM

## 2025-07-23 DIAGNOSIS — I10 ESSENTIAL (PRIMARY) HYPERTENSION: ICD-10-CM

## 2025-07-23 DIAGNOSIS — J43.9 PULMONARY EMPHYSEMA, UNSPECIFIED EMPHYSEMA TYPE (HCC): ICD-10-CM

## 2025-07-23 RX ORDER — TIOTROPIUM BROMIDE 18 UG/1
18 CAPSULE ORAL; RESPIRATORY (INHALATION) DAILY
Qty: 30 CAPSULE | Refills: 5 | Status: SHIPPED | OUTPATIENT
Start: 2025-07-23

## 2025-07-23 RX ORDER — FLUTICASONE FUROATE AND VILANTEROL TRIFENATATE 100; 25 UG/1; UG/1
1 POWDER RESPIRATORY (INHALATION) DAILY
Qty: 1 EACH | Refills: 5 | Status: SHIPPED | OUTPATIENT
Start: 2025-07-23

## 2025-07-23 ASSESSMENT — ENCOUNTER SYMPTOMS
COLOR CHANGE: 0
SORE THROAT: 0
ABDOMINAL PAIN: 0
CONSTIPATION: 0
WHEEZING: 0
COUGH: 0
SINUS PRESSURE: 0
CHOKING: 0
PHOTOPHOBIA: 0
EYE ITCHING: 0
SHORTNESS OF BREATH: 1
FACIAL SWELLING: 0
BACK PAIN: 0
TROUBLE SWALLOWING: 0
EYE PAIN: 0
BLOOD IN STOOL: 0
SINUS PAIN: 0
VOMITING: 0
VOICE CHANGE: 0
NAUSEA: 0
CHEST TIGHTNESS: 0
EYE REDNESS: 0
DIARRHEA: 0
RHINORRHEA: 0
ABDOMINAL DISTENTION: 0
EYE DISCHARGE: 0

## 2025-07-23 NOTE — ASSESSMENT & PLAN NOTE
Chronic, not at goal (unstable), continue current treatment plan, medication adherence emphasized, and lifestyle modifications recommended    Orders:    tiotropium (SPIRIVA HANDIHALER) 18 MCG inhalation capsule; Inhale 1 capsule into the lungs daily

## 2025-07-23 NOTE — PROGRESS NOTES
25  Kelley Platt : 1950 Sex: female  Age: 75 y.o.    Chief Complaint   Patient presents with    3 Month Follow-Up     Patient refusing mammogram at this time         Kelley is a 75 y.o. female seen today for a follow-up and a review of her overall condition and plan of care.  She reports that she is doing fairly well today, but states that she has been having difficulty breathing, when the humidity is high.  Since the humidity has been lower, recently, her breathing has been \"better\".  She also notes wheezing \"a little bit\", when humidity is high.  She states it will only last for a little while, until she clears her throat.  She has been using her oxygen at night for 20 to 30 minutes, then does take it off.  She does not feel that she needs it during the day.  She also does not want to become dependent upon it.  She states that her appetite has not changed, but she has been trying to eat more regularly, to maintain her weight.  She did lose 1 pound, since she was seen in April.  She denies any further acute complaints, at this time.  She does need a refill on her Spiriva and Breo.  She denies any acute confusion, forgetfulness or any change in cognition.        Review of Systems   Constitutional:  Positive for appetite change (\"Not changed\") and fatigue (\"During the Summer\"). Negative for chills, diaphoresis, fever and unexpected weight change.   HENT:  Negative for congestion, ear pain, facial swelling, hearing loss, nosebleeds, postnasal drip, rhinorrhea, sinus pressure, sinus pain, sneezing, sore throat, tinnitus, trouble swallowing and voice change.    Eyes:  Negative for photophobia, pain, discharge, redness and itching.   Respiratory:  Positive for shortness of breath (She has been having difficulty breathing, when the humidity is high.  Since the humidty has been lower, her breathing has been \"better\"). Negative for cough, choking, chest tightness and wheezing (\"A little bit\"  She reports

## 2025-07-23 NOTE — ASSESSMENT & PLAN NOTE
Chronic, not at goal (unstable), continue current treatment plan, medication adherence emphasized, and lifestyle modifications recommended    Orders:    tiotropium (SPIRIVA HANDIHALER) 18 MCG inhalation capsule; Inhale 1 capsule into the lungs daily    BREO ELLIPTA 100-25 MCG/ACT inhaler; Inhale 1 puff into the lungs daily